# Patient Record
Sex: FEMALE | Race: BLACK OR AFRICAN AMERICAN | Employment: UNEMPLOYED | URBAN - METROPOLITAN AREA
[De-identification: names, ages, dates, MRNs, and addresses within clinical notes are randomized per-mention and may not be internally consistent; named-entity substitution may affect disease eponyms.]

---

## 2017-07-24 ENCOUNTER — HOSPITAL ENCOUNTER (EMERGENCY)
Age: 52
Discharge: HOME | End: 2017-07-24
Attending: FAMILY MEDICINE

## 2017-07-24 VITALS
HEIGHT: 63 IN | TEMPERATURE: 98.1 F | RESPIRATION RATE: 16 BRPM | BODY MASS INDEX: 43.85 KG/M2 | SYSTOLIC BLOOD PRESSURE: 223 MMHG | OXYGEN SATURATION: 94 % | HEART RATE: 56 BPM | DIASTOLIC BLOOD PRESSURE: 102 MMHG | WEIGHT: 247.5 LBS

## 2017-07-24 DIAGNOSIS — I10 ESSENTIAL HYPERTENSION: Primary | ICD-10-CM

## 2017-07-24 RX ORDER — LOSARTAN POTASSIUM AND HYDROCHLOROTHIAZIDE 12.5; 5 MG/1; MG/1
1 TABLET ORAL DAILY
Qty: 30 TAB | Refills: 1 | Status: SHIPPED | OUTPATIENT
Start: 2017-07-24 | End: 2018-02-14

## 2017-07-25 NOTE — DISCHARGE INSTRUCTIONS

## 2017-07-25 NOTE — UC PROVIDER NOTE
HPI Comments: Tova, with hx of HTN not taking medication for \"months\" presents with elevated BP, check BP at work today and was [employer] high. \" Denies CP, SOB, vision change, dizziness, HA, N/V. The history is provided by the patient. Past Medical History:   Diagnosis Date    Asthma     Bronchitis     Diabetes ([address]     Type II, no insulin    Hypertension     No medication taken per patient        Past Surgical History:   Procedure Laterality Date    HX GYN  1984 and 1992    C-sections    HX GYN      Uterine ablation         History reviewed. No pertinent family history. Social History     Social History    Marital status: SINGLE     Spouse name: N/A    Number of children: N/A    Years of education: N/A     Occupational History    Not on file. Social History Main Topics    Smoking status: Never Smoker    Smokeless tobacco: Not on file    Alcohol use No    Drug use: No    Sexual activity: Not on file     Other Topics Concern    Not on file     Social History Narrative                ALLERGIES: Latex    Review of Systems   Constitutional: Negative for chills and fever. Eyes: Negative for visual disturbance. Respiratory: Negative for shortness of breath and wheezing. Cardiovascular: Negative for chest pain and palpitations. Gastrointestinal: Negative for nausea and vomiting. Musculoskeletal: Negative for myalgias. Skin: Negative for rash. Neurological: Negative for dizziness, weakness and light-headedness. Vitals:    07/24/17 1952   BP: (!) 225/94   Pulse: (!) 56   Resp: 16   Temp: 98.1 °F (36.7 °C)   SpO2: 94%   Weight: 112.3 kg (247 lb 8 oz)   Height: 5' 3\" (1.6 m)       Physical Exam   Constitutional: She appears well-developed and well-nourished. No distress. Eyes:   Fundoscopic exam:       The right eye shows no papilledema. The left eye shows no papilledema. Cardiovascular: Normal rate, regular rhythm and normal heart sounds.     Pulmonary/Chest: Effort normal and breath sounds normal. No respiratory distress. She has no wheezes. She has no rales. Neurological: She is alert. Skin: She is not diaphoretic. Psychiatric: She has a normal mood and affect. Her behavior is normal. Judgment and thought content normal.   Nursing note and vitals reviewed. MDM     Differential Diagnosis; Clinical Impression; Plan:     CLINICAL IMPRESSION:  Essential hypertension  (primary encounter diagnosis)    Plan:  1. Losartan-HCTZ  2. Keep BP log  3. F/u with pcp ASAP - list given  Risk of Significant Complications, Morbidity, and/or Mortality:   Presenting problems: Moderate  Management options:   Moderate      Procedures

## 2018-02-13 ENCOUNTER — HOSPITAL ENCOUNTER (EMERGENCY)
Age: 53
Discharge: HOME | End: 2018-02-14
Attending: EMERGENCY MEDICINE
Payer: SELF-PAY

## 2018-02-13 DIAGNOSIS — D25.9 UTERINE LEIOMYOMA, UNSPECIFIED LOCATION: ICD-10-CM

## 2018-02-13 DIAGNOSIS — E11.65 TYPE 2 DIABETES MELLITUS WITH HYPERGLYCEMIA, WITHOUT LONG-TERM CURRENT USE OF INSULIN (HCC): ICD-10-CM

## 2018-02-13 DIAGNOSIS — R10.31 ABDOMINAL PAIN, RIGHT LOWER QUADRANT: Primary | ICD-10-CM

## 2018-02-13 LAB
APPEARANCE UR: CLEAR
BACTERIA URNS QL MICRO: NEGATIVE /HPF
BILIRUB UR QL: NEGATIVE
COLOR UR: ABNORMAL
EPITH CASTS URNS QL MICRO: ABNORMAL /LPF
GLUCOSE UR STRIP.AUTO-MCNC: >1000 MG/DL
HGB UR QL STRIP: NEGATIVE
HYALINE CASTS URNS QL MICRO: ABNORMAL /LPF (ref 0–5)
KETONES UR QL STRIP.AUTO: NEGATIVE MG/DL
LEUKOCYTE ESTERASE UR QL STRIP.AUTO: NEGATIVE
NITRITE UR QL STRIP.AUTO: NEGATIVE
PH UR STRIP: 7 [PH] (ref 5–8)
PROT UR STRIP-MCNC: NEGATIVE MG/DL
RBC #/AREA URNS HPF: ABNORMAL /HPF (ref 0–5)
SP GR UR REFRACTOMETRY: 1.01 (ref 1–1.03)
UA: UC IF INDICATED,UAUC: ABNORMAL
UROBILINOGEN UR QL STRIP.AUTO: 1 EU/DL (ref 0.2–1)
WBC URNS QL MICRO: ABNORMAL /HPF (ref 0–4)

## 2018-02-13 PROCEDURE — 85025 COMPLETE CBC W/AUTO DIFF WBC: CPT | Performed by: EMERGENCY MEDICINE

## 2018-02-13 PROCEDURE — 99283 EMERGENCY DEPT VISIT LOW MDM: CPT

## 2018-02-13 PROCEDURE — 80053 COMPREHEN METABOLIC PANEL: CPT | Performed by: EMERGENCY MEDICINE

## 2018-02-13 PROCEDURE — 36415 COLL VENOUS BLD VENIPUNCTURE: CPT | Performed by: EMERGENCY MEDICINE

## 2018-02-13 PROCEDURE — 81001 URINALYSIS AUTO W/SCOPE: CPT | Performed by: EMERGENCY MEDICINE

## 2018-02-14 ENCOUNTER — APPOINTMENT (OUTPATIENT)
Dept: CT IMAGING | Age: 53
End: 2018-02-14
Attending: EMERGENCY MEDICINE
Payer: SELF-PAY

## 2018-02-14 VITALS
BODY MASS INDEX: 45.16 KG/M2 | SYSTOLIC BLOOD PRESSURE: 175 MMHG | HEART RATE: 71 BPM | WEIGHT: 254.85 LBS | TEMPERATURE: 98.3 F | DIASTOLIC BLOOD PRESSURE: 90 MMHG | HEIGHT: 63 IN | OXYGEN SATURATION: 100 % | RESPIRATION RATE: 16 BRPM

## 2018-02-14 LAB
ALBUMIN SERPL-MCNC: 3.3 G/DL (ref 3.5–5)
ALBUMIN/GLOB SERPL: 0.8 {RATIO} (ref 1.1–2.2)
ALP SERPL-CCNC: 142 U/L (ref 45–117)
ALT SERPL-CCNC: 38 U/L (ref 12–78)
ANION GAP SERPL CALC-SCNC: 7 MMOL/L (ref 5–15)
AST SERPL-CCNC: 15 U/L (ref 15–37)
BASOPHILS # BLD: 0 K/UL (ref 0–0.1)
BASOPHILS NFR BLD: 0 % (ref 0–1)
BILIRUB SERPL-MCNC: 0.4 MG/DL (ref 0.2–1)
BUN SERPL-MCNC: 11 MG/DL (ref 6–20)
BUN/CREAT SERPL: 13 (ref 12–20)
CALCIUM SERPL-MCNC: 9.1 MG/DL (ref 8.5–10.1)
CHLORIDE SERPL-SCNC: 103 MMOL/L (ref 97–108)
CO2 SERPL-SCNC: 29 MMOL/L (ref 21–32)
CREAT SERPL-MCNC: 0.87 MG/DL (ref 0.55–1.02)
DIFFERENTIAL METHOD BLD: ABNORMAL
EOSINOPHIL # BLD: 0 K/UL (ref 0–0.4)
EOSINOPHIL NFR BLD: 0 % (ref 0–7)
ERYTHROCYTE [DISTWIDTH] IN BLOOD BY AUTOMATED COUNT: 12.1 % (ref 11.5–14.5)
GLOBULIN SER CALC-MCNC: 3.9 G/DL (ref 2–4)
GLUCOSE SERPL-MCNC: 290 MG/DL (ref 65–100)
HCT VFR BLD AUTO: 42 % (ref 35–47)
HGB BLD-MCNC: 13.4 G/DL (ref 11.5–16)
IMM GRANULOCYTES # BLD: 0 K/UL (ref 0–0.04)
IMM GRANULOCYTES NFR BLD AUTO: 0 % (ref 0–0.5)
LYMPHOCYTES # BLD: 3.7 K/UL (ref 0.8–3.5)
LYMPHOCYTES NFR BLD: 36 % (ref 12–49)
MCH RBC QN AUTO: 27.3 PG (ref 26–34)
MCHC RBC AUTO-ENTMCNC: 31.9 G/DL (ref 30–36.5)
MCV RBC AUTO: 85.7 FL (ref 80–99)
MONOCYTES # BLD: 0.8 K/UL (ref 0–1)
MONOCYTES NFR BLD: 7 % (ref 5–13)
NEUTS SEG # BLD: 5.8 K/UL (ref 1.8–8)
NEUTS SEG NFR BLD: 56 % (ref 32–75)
NRBC # BLD: 0 K/UL (ref 0–0.01)
NRBC BLD-RTO: 0 PER 100 WBC
PLATELET # BLD AUTO: 205 K/UL (ref 150–400)
PMV BLD AUTO: 11.2 FL (ref 8.9–12.9)
POTASSIUM SERPL-SCNC: 3.6 MMOL/L (ref 3.5–5.1)
PROT SERPL-MCNC: 7.2 G/DL (ref 6.4–8.2)
RBC # BLD AUTO: 4.9 M/UL (ref 3.8–5.2)
SODIUM SERPL-SCNC: 139 MMOL/L (ref 136–145)
WBC # BLD AUTO: 10.3 K/UL (ref 3.6–11)

## 2018-02-14 PROCEDURE — 96374 THER/PROPH/DIAG INJ IV PUSH: CPT

## 2018-02-14 PROCEDURE — 96375 TX/PRO/DX INJ NEW DRUG ADDON: CPT

## 2018-02-14 PROCEDURE — 96361 HYDRATE IV INFUSION ADD-ON: CPT

## 2018-02-14 PROCEDURE — 74177 CT ABD & PELVIS W/CONTRAST: CPT

## 2018-02-14 PROCEDURE — 74011250636 HC RX REV CODE- 250/636

## 2018-02-14 PROCEDURE — 74011250636 HC RX REV CODE- 250/636: Performed by: EMERGENCY MEDICINE

## 2018-02-14 PROCEDURE — 96376 TX/PRO/DX INJ SAME DRUG ADON: CPT

## 2018-02-14 PROCEDURE — 74011636320 HC RX REV CODE- 636/320: Performed by: EMERGENCY MEDICINE

## 2018-02-14 RX ORDER — ONDANSETRON 2 MG/ML
4 INJECTION INTRAMUSCULAR; INTRAVENOUS
Status: COMPLETED | OUTPATIENT
Start: 2018-02-14 | End: 2018-02-14

## 2018-02-14 RX ORDER — METFORMIN HYDROCHLORIDE 500 MG/1
500 TABLET ORAL 2 TIMES DAILY WITH MEALS
Qty: 60 TAB | Refills: 4 | Status: SHIPPED | OUTPATIENT
Start: 2018-02-14 | End: 2018-05-01

## 2018-02-14 RX ORDER — MORPHINE SULFATE 2 MG/ML
4 INJECTION, SOLUTION INTRAMUSCULAR; INTRAVENOUS
Status: COMPLETED | OUTPATIENT
Start: 2018-02-14 | End: 2018-02-14

## 2018-02-14 RX ORDER — SODIUM CHLORIDE 0.9 % (FLUSH) 0.9 %
10 SYRINGE (ML) INJECTION
Status: COMPLETED | OUTPATIENT
Start: 2018-02-14 | End: 2018-02-14

## 2018-02-14 RX ORDER — OXYCODONE AND ACETAMINOPHEN 5; 325 MG/1; MG/1
1 TABLET ORAL
Qty: 20 TAB | Refills: 0 | Status: SHIPPED | OUTPATIENT
Start: 2018-02-14 | End: 2018-05-01

## 2018-02-14 RX ORDER — MORPHINE SULFATE 2 MG/ML
INJECTION, SOLUTION INTRAMUSCULAR; INTRAVENOUS
Status: DISCONTINUED
Start: 2018-02-14 | End: 2018-02-14 | Stop reason: HOSPADM

## 2018-02-14 RX ORDER — ONDANSETRON 2 MG/ML
INJECTION INTRAMUSCULAR; INTRAVENOUS
Status: DISCONTINUED
Start: 2018-02-14 | End: 2018-02-14 | Stop reason: HOSPADM

## 2018-02-14 RX ORDER — LOSARTAN POTASSIUM AND HYDROCHLOROTHIAZIDE 12.5; 5 MG/1; MG/1
1 TABLET ORAL DAILY
Qty: 30 TAB | Refills: 4 | Status: SHIPPED | OUTPATIENT
Start: 2018-02-14 | End: 2018-05-01

## 2018-02-14 RX ORDER — SODIUM CHLORIDE 9 MG/ML
50 INJECTION, SOLUTION INTRAVENOUS
Status: COMPLETED | OUTPATIENT
Start: 2018-02-14 | End: 2018-02-14

## 2018-02-14 RX ADMIN — MORPHINE SULFATE 4 MG: 2 INJECTION, SOLUTION INTRAMUSCULAR; INTRAVENOUS at 01:15

## 2018-02-14 RX ADMIN — SODIUM CHLORIDE 50 ML/HR: 900 INJECTION, SOLUTION INTRAVENOUS at 00:55

## 2018-02-14 RX ADMIN — ONDANSETRON HYDROCHLORIDE 4 MG: 2 INJECTION, SOLUTION INTRAMUSCULAR; INTRAVENOUS at 00:09

## 2018-02-14 RX ADMIN — Medication 10 ML: at 00:55

## 2018-02-14 RX ADMIN — IOPAMIDOL 100 ML: 755 INJECTION, SOLUTION INTRAVENOUS at 00:55

## 2018-02-14 RX ADMIN — ONDANSETRON HYDROCHLORIDE 4 MG: 2 INJECTION, SOLUTION INTRAMUSCULAR; INTRAVENOUS at 01:15

## 2018-02-14 RX ADMIN — MORPHINE SULFATE 4 MG: 2 INJECTION, SOLUTION INTRAMUSCULAR; INTRAVENOUS at 00:09

## 2018-02-14 RX ADMIN — SODIUM CHLORIDE 1000 ML: 900 INJECTION, SOLUTION INTRAVENOUS at 00:09

## 2018-02-14 NOTE — ED NOTES
Dr. Bonita Palomo went over dc instructions with pt at this time. Pt dc home with family via wheelchair.

## 2018-02-14 NOTE — DISCHARGE INSTRUCTIONS
Uterine Fibroids: Care Instructions  Your Care Instructions    Uterine fibroids are growths in the uterus. Fibroids aren't cancer. Doctors don't know what causes fibroids. Fibroids are very common in women during their childbearing years. Fibroids can grow on the inside of the uterus, in the muscle wall of the uterus, or near the outside wall of the uterus. In some women, fibroids cause painful cramps and heavy periods. In these cases, taking anti-inflammatory medicines, birth control pills, or using an intrauterine device (IUD) often helps decrease symptoms. Sometimes surgery is needed to treat fibroids. But if you are near menopause, you may want to wait and see if your symptoms get better. Most fibroids shrink and go away after menopause, when your menstrual periods stop completely. Follow-up care is a key part of your treatment and safety. Be sure to make and go to all appointments, and call your doctor if you are having problems. It's also a good idea to know your test results and keep a list of the medicines you take. How can you care for yourself at home? · If your doctor gave you medicine, take it as exactly as prescribed. Be safe with medicines. Call your doctor if you think you are having a problem with your medicine. · Take anti-inflammatory medicines for pain. These include ibuprofen (Advil, Motrin) and naproxen (Aleve). Read and follow all instructions on the label. · Use heat, such as a hot water bottle or a heating pad set on low, or a warm bath to relax tense muscles and relieve cramping. Put a thin cloth between the heating pad and your skin. Never go to sleep with a heating pad on. · Lie down and put a pillow under your knees. Or, lie on your side and bring your knees up to your chest. These positions may help relieve belly pain or pressure. · Keep track of how many sanitary pads or tampons you use each day. · Get at least 30 minutes of exercise on most days of the week.  Walking is a good choice. You also may want to do other activities, such as running, swimming, cycling, or playing tennis or team sports. · If you bleed longer than usual or have heavy bleeding, take a daily multivitamin with iron. When should you call for help? Call your doctor now or seek immediate medical care if:  ? · You have severe vaginal bleeding. ? · You have new or worse belly or pelvic pain. ? Watch closely for changes in your health, and be sure to contact your doctor if:  ? · You have unusual vaginal bleeding. ? · You do not get better as expected. Where can you learn more? Go to http://nash-marivel.info/. Enter B121 in the search box to learn more about \"Uterine Fibroids: Care Instructions. \"  Current as of: October 13, 2016  Content Version: 11.4  © 7364-9816 [employer]. Care instructions adapted under license by [employer] (which disclaims liability or warranty for this information). If you have questions about a medical condition or this instruction, always ask your healthcare professional. [place] [address] any warranty or liability for your use of this information. Type 2 Diabetes: Care Instructions  Your Care Instructions    Type 2 diabetes is a disease that develops when the body's tissues cannot use insulin properly. Over time, the pancreas cannot make enough insulin. Insulin is a hormone that helps the body's cells use sugar (glucose) for energy. It also helps the body store extra sugar in muscle, fat, and liver cells. Without insulin, the sugar cannot get into the cells to do its work. It stays in the blood instead. This can cause high blood sugar levels. A person has diabetes when the blood sugar stays too high too much of the time. Over time, diabetes can lead to diseases of the heart, blood vessels, nerves, kidneys, and eyes.   You may be able to control your blood sugar by losing weight, eating a healthy diet, and getting daily exercise. You may also have to take insulin or other diabetes medicine. Follow-up care is a key part of your treatment and safety. Be sure to make and go to all appointments. Call your doctor if you are having problems. It's also a good idea to know your test results and keep a list of the medicines you take. How can you care for yourself at home? · Keep your blood sugar at a target level (which you set with your doctor). ¨ Eat a good diet that spreads carbohydrate throughout the day. Carbohydrate-the body's main source of fuel-affects blood sugar more than any other nutrient. Carbohydrate is in fruits, vegetables, milk, and yogurt. It also is in breads, cereals, vegetables such as potatoes and corn, and sugary foods such as candy and cakes. ¨ Aim for 30 minutes of exercise on most, preferably all, days of the week. Walking is a good choice. You also may want to do other activities, such as running, swimming, cycling, or playing tennis or team sports. If your doctor says it's okay, do muscle-strengthening exercises at least 2 times a week. ¨ Take your medicines exactly as prescribed. Call your doctor if you think you are having a problem with your medicine. You will get more details on the specific medicines your doctor prescribes. · Check your blood sugar as often as your doctor recommends. It is important to keep track of any symptoms you have, such as low blood sugar. Also tell your doctor if you have any changes in your activities, diet, or insulin use. · Talk to your doctor before you start taking aspirin every day. Aspirin can help certain people lower their risk of a heart attack or stroke. But taking aspirin isn't right for everyone, because it can cause serious bleeding. · Do not smoke. If you need help quitting, talk to your doctor about stop-smoking programs and medicines. These can increase your chances of quitting for good.   · Keep your cholesterol and blood pressure at normal levels. You may need to take one or more medicines to reach your goals. Take them exactly as directed. Do not stop or change a medicine without talking to your doctor first.  When should you call for help? Call 911 anytime you think you may need emergency care. For example, call if:  ? · You passed out (lost consciousness), or you suddenly become very sleepy or confused. (You may have very low blood sugar.)   ? Call your doctor now or seek immediate medical care if:  ? · Your blood sugar is 300 mg/dL or is higher than the level your doctor has set for you. ? · You have symptoms of low blood sugar, such as:  ¨ Sweating. ¨ Feeling nervous, shaky, and weak. ¨ Extreme hunger and slight nausea. ¨ Dizziness and headache. ¨ Blurred vision. ¨ Confusion. ? Watch closely for changes in your health, and be sure to contact your doctor if:  ? · You often have problems controlling your blood sugar. ? · You have symptoms of long-term diabetes problems, such as:  ¨ New vision changes. ¨ New pain, numbness, or tingling in your hands or feet. ¨ Skin problems. Where can you learn more? Go to http://nash-marivel.info/. Enter C553 in the search box to learn more about \"Type 2 Diabetes: Care Instructions. \"  Current as of: March 13, 2017  Content Version: 11.4  © 3298-0680 [employer]. Care instructions adapted under license by [employer] (which disclaims liability or warranty for this information). If you have questions about a medical condition or this instruction, always ask your healthcare professional. [place] [address] any warranty or liability for your use of this information. Abdominal Pain: Care Instructions  Your Care Instructions    Abdominal pain has many possible causes. Some aren't serious and get better on their own in a few days. Others need more testing and treatment.  If your pain continues or gets worse, you need to be rechecked and may need more tests to find out what is wrong. You may need surgery to correct the problem. Don't ignore new symptoms, such as fever, nausea and vomiting, urination problems, pain that gets worse, and dizziness. These may be signs of a more serious problem. Your doctor may have recommended a follow-up visit in the next 8 to 12 hours. If you are not getting better, you may need more tests or treatment. The doctor has checked you carefully, but problems can develop later. If you notice any problems or new symptoms, get medical treatment right away. Follow-up care is a key part of your treatment and safety. Be sure to make and go to all appointments, and call your doctor if you are having problems. It's also a good idea to know your test results and keep a list of the medicines you take. How can you care for yourself at home? · Rest until you feel better. · To prevent dehydration, drink plenty of fluids, enough so that your urine is light yellow or clear like water. Choose water and other caffeine-free clear liquids until you feel better. If you have kidney, heart, or liver disease and have to limit fluids, talk with your doctor before you increase the amount of fluids you drink. · If your stomach is upset, eat mild foods, such as rice, dry toast or crackers, bananas, and applesauce. Try eating several small meals instead of two or three large ones. · Wait until 48 hours after all symptoms have gone away before you have spicy foods, alcohol, and drinks that contain caffeine. · Do not eat foods that are high in fat. · Avoid anti-inflammatory medicines such as aspirin, ibuprofen (Advil, Motrin), and naproxen (Aleve). These can cause stomach upset. Talk to your doctor if you take daily aspirin for another health problem. When should you call for help? Call 911 anytime you think you may need emergency care. For example, call if:  ? · You passed out (lost consciousness). ? · You pass maroon or very bloody stools. ? · You vomit blood or what looks like coffee grounds. ? · You have new, severe belly pain. ?Call your doctor now or seek immediate medical care if:  ? · Your pain gets worse, especially if it becomes focused in one area of your belly. ? · You have a new or higher fever. ? · Your stools are black and look like tar, or they have streaks of blood. ? · You have unexpected vaginal bleeding. ? · You have symptoms of a urinary tract infection. These may include:  ¨ Pain when you urinate. ¨ Urinating more often than usual.  ¨ Blood in your urine. ? · You are dizzy or lightheaded, or you feel like you may faint. ? Watch closely for changes in your health, and be sure to contact your doctor if:  ? · You are not getting better after 1 day (24 hours). Where can you learn more? Go to http://nash-marivel.info/. Enter G405 in the search box to learn more about \"Abdominal Pain: Care Instructions. \"  Current as of: March 20, 2017  Content Version: 11.4  © 4042-0428 [employer]. Care instructions adapted under license by [employer] (which disclaims liability or warranty for this information). If you have questions about a medical condition or this instruction, always ask your healthcare professional. [address] any warranty or liability for your use of this information.

## 2018-02-14 NOTE — ED PROVIDER NOTES
EMERGENCY DEPARTMENT HISTORY AND PHYSICAL EXAM      Date: 2/13/2018  Patient Name: Concepcion Kelley    History of Presenting Illness     Chief Complaint   Patient presents with    Abdominal Pain     Pt ambulatory to triage with c/o abdominal cramping for 2 days. Pt states she started her menstual cycle for  the first time in 7 months. Reports nausea. History Provided By: Patient    HPI: Concepcion Kelley, 50 y.o. female with PMHx significant for uterine ablation, presents ambulatory to the ED with c/o gradual onset intermittent lower abdominal pain with associated nausea since yesterday. She reports exacerbation in pain with movement. Pt describes her pain as \"feeling like I'm in labor. \" She notes she she started her menstrual cycle yesterday for the first time in 7 months. Pt states she has been told in the past that she has uterine fibroids. Pt further endorses polyuria and mild \"pressure\" when urinating. Pt states she is currently uninsured and ran out of her medication for HTN and DM months ago. She also does not currently have an OB/GYN and was last seen by [facility] in March 2017. She specifically denies any recent unexpected weight loss, decreased appetite, rectal bleeding, melena, hematochezia, vomiting, or fevers. There are no other complaints, changes, or physical findings at this time. Current Outpatient Prescriptions   Medication Sig Dispense Refill    losartan-hydroCHLOROthiazide (HYZAAR) 50-12.5 mg per tablet Take 1 Tab by mouth daily. 30 Tab 4    metFORMIN (GLUCOPHAGE) 500 mg tablet Take 1 Tab by mouth two (2) times daily (with meals). 60 Tab 4    oxyCODONE-acetaminophen (PERCOCET) 5-325 mg per tablet Take 1 Tab by mouth every four (4) hours as needed for Pain. Max Daily Amount: 6 Tabs.  20 Tab 0       Past History     Past Medical History:  Past Medical History:   Diagnosis Date    Asthma     Bronchitis     Diabetes (HCC)     Type II, no insulin    Hypertension No medication taken per patient       Past Surgical History:  Past Surgical History:   Procedure Laterality Date    HX GYN  1984 and 1992    C-sections    HX GYN      Uterine ablation       Family History:  No family history on file. Social History:  Social History   Substance Use Topics    Smoking status: Never Smoker    Smokeless tobacco: Not on file    Alcohol use No       Allergies: Allergies   Allergen Reactions    Latex Other (comments)     peeling         Review of Systems   Review of Systems   Constitutional: Negative for activity change, chills, fever and unexpected weight change. HENT: Negative for congestion and sore throat. Eyes: Negative for pain and redness. Respiratory: Negative for cough, chest tightness and shortness of breath. Cardiovascular: Negative for chest pain and palpitations. Gastrointestinal: Positive for abdominal pain and nausea. Negative for blood in stool, diarrhea and vomiting.        -melena   Endocrine: Positive for polyuria. Genitourinary: Negative for dysuria, frequency and urgency. +\"pressure\" while urinating   Musculoskeletal: Negative for back pain and neck pain. Skin: Negative for rash. Neurological: Negative for syncope, light-headedness and headaches. Psychiatric/Behavioral: Negative for confusion. All other systems reviewed and are negative. Physical Exam   Physical Exam   Constitutional: She is oriented to person, place, and time. She appears well-developed and well-nourished. No distress. HENT:   Head: Normocephalic. Nose: Nose normal.   Mouth/Throat: Oropharynx is clear and moist. No oropharyngeal exudate. Eyes: Conjunctivae are normal. Pupils are equal, round, and reactive to light. No scleral icterus. Neck: Normal range of motion. Neck supple. No JVD present. No tracheal deviation present. No thyromegaly present. Cardiovascular: Normal rate, regular rhythm and intact distal pulses.   Exam reveals no gallop and no friction rub. No murmur heard. Pulmonary/Chest: Effort normal and breath sounds normal. No stridor. No respiratory distress. She has no wheezes. She has no rales. Abdominal: Soft. Bowel sounds are normal. She exhibits no distension. There is no rebound and no guarding. Moderate diffuse lower abdominal tenderness, R>L   Musculoskeletal: Normal range of motion. She exhibits no edema. Lymphadenopathy:     She has no cervical adenopathy. Neurological: She is alert and oriented to person, place, and time. No cranial nerve deficit. She exhibits normal muscle tone. Coordination normal.   Skin: Skin is warm and dry. No rash noted. She is not diaphoretic. No erythema. Psychiatric: She has a normal mood and affect. Her behavior is normal.   Nursing note and vitals reviewed.       Diagnostic Study Results   Labs -   Recent Results (from the past 12 hour(s))   URINALYSIS W/ REFLEX CULTURE    Collection Time: 02/13/18 10:46 PM   Result Value Ref Range    Color YELLOW/STRAW      Appearance CLEAR CLEAR      Specific gravity 1.015 1.003 - 1.030      pH (UA) 7.0 5.0 - 8.0      Protein NEGATIVE  NEG mg/dL    Glucose >1000 (A) NEG mg/dL    Ketone NEGATIVE  NEG mg/dL    Bilirubin NEGATIVE  NEG      Blood NEGATIVE  NEG      Urobilinogen 1.0 0.2 - 1.0 EU/dL    Nitrites NEGATIVE  NEG      Leukocyte Esterase NEGATIVE  NEG      UA:UC IF INDICATED CULTURE NOT INDICATED BY UA RESULT CNI      WBC 0-4 0 - 4 /hpf    RBC 0-5 0 - 5 /hpf    Epithelial cells FEW FEW /lpf    Bacteria NEGATIVE  NEG /hpf    Hyaline cast 0-2 0 - 5 /lpf   CBC WITH AUTOMATED DIFF    Collection Time: 02/13/18 11:36 PM   Result Value Ref Range    WBC 10.3 3.6 - 11.0 K/uL    RBC 4.90 3.80 - 5.20 M/uL    HGB 13.4 11.5 - 16.0 g/dL    HCT 42.0 35.0 - 47.0 %    MCV 85.7 80.0 - 99.0 FL    MCH 27.3 26.0 - 34.0 PG    MCHC 31.9 30.0 - 36.5 g/dL    RDW 12.1 11.5 - 14.5 %    PLATELET 502 502 - 411 K/uL    MPV 11.2 8.9 - 12.9 FL    NRBC 0.0 0  WBC    ABSOLUTE NRBC 0. 00 0.00 - 0.01 K/uL    NEUTROPHILS 56 32 - 75 %    LYMPHOCYTES 36 12 - 49 %    MONOCYTES 7 5 - 13 %    EOSINOPHILS 0 0 - 7 %    BASOPHILS 0 0 - 1 %    IMMATURE GRANULOCYTES 0 0.0 - 0.5 %    ABS. NEUTROPHILS 5.8 1.8 - 8.0 K/UL    ABS. LYMPHOCYTES 3.7 (H) 0.8 - 3.5 K/UL    ABS. MONOCYTES 0.8 0.0 - 1.0 K/UL    ABS. EOSINOPHILS 0.0 0.0 - 0.4 K/UL    ABS. BASOPHILS 0.0 0.0 - 0.1 K/UL    ABS. IMM. GRANS. 0.0 0.00 - 0.04 K/UL    DF AUTOMATED     METABOLIC PANEL, COMPREHENSIVE    Collection Time: 02/13/18 11:36 PM   Result Value Ref Range    Sodium 139 136 - 145 mmol/L    Potassium 3.6 3.5 - 5.1 mmol/L    Chloride 103 97 - 108 mmol/L    CO2 29 21 - 32 mmol/L    Anion gap 7 5 - 15 mmol/L    Glucose 290 (H) 65 - 100 mg/dL    BUN 11 6 - 20 MG/DL    Creatinine 0.87 0.55 - 1.02 MG/DL    BUN/Creatinine ratio 13 12 - 20      GFR est AA >60 >60 ml/min/1.73m2    GFR est non-AA >60 >60 ml/min/1.73m2    Calcium 9.1 8.5 - 10.1 MG/DL    Bilirubin, total 0.4 0.2 - 1.0 MG/DL    ALT (SGPT) 38 12 - 78 U/L    AST (SGOT) 15 15 - 37 U/L    Alk. phosphatase 142 (H) 45 - 117 U/L    Protein, total 7.2 6.4 - 8.2 g/dL    Albumin 3.3 (L) 3.5 - 5.0 g/dL    Globulin 3.9 2.0 - 4.0 g/dL    A-G Ratio 0.8 (L) 1.1 - 2.2         Radiologic Studies -  CT Results  (Last 48 hours)               02/14/18 0055  CT ABD PELV W CONT Final result    Impression:  IMPRESSION:       1. Normal appendix. 2. Small amount of fluid around the bilateral ovaries. Uterine fibroids. Prominent endometrial canal, without definite change, correlate clinically. Narrative:  INDICATION: Abdominal pain, RLQ       COMPARISON: May 16, 2016       TECHNIQUE:    Following the uneventful intravenous administration of 100 cc Isovue-370, thin   axial images were obtained through the abdomen and pelvis. Coronal and sagittal   reconstructions were generated. Oral contrast was not administered.  CT dose   reduction was achieved through use of a standardized protocol tailored for this examination and automatic exposure control for dose modulation. FINDINGS:    LUNG BASES: No abnormality. LIVER: No mass or biliary dilatation. GALLBLADDER: Unremarkable. SPLEEN: No enlargement or lesion. PANCREAS: No mass or ductal dilatation. ADRENALS: No mass. KIDNEYS: No mass, calculus, or hydronephrosis. GI TRACT:  No bowel obstruction or wall thickening allowing for lack of oral   contrast   PERITONEUM: No free air or free fluid. APPENDIX: Unremarkable. RETROPERITONEUM: No lymphadenopathy or aortic aneurysm. ADDITIONAL COMMENTS: N/A.       URINARY BLADDER: Unremarkable. REPRODUCTIVE ORGANS: Uterus is present with unchanged focal low density in the   uterine fundus/endometrial canal. Slight nodular appearance of the uterus   suggests underlying fibroids. Small amount of free fluid surrounding both   ovaries. LYMPH NODES:  None enlarged. FREE FLUID:  Small amount around the adnexal regions and cul-de-sac. BONES: No destructive bone lesion. ADDITIONAL COMMENTS: N/A. Medical Decision Making   I am the first provider for this patient. I reviewed the vital signs, available nursing notes, past medical history, past surgical history, family history and social history. Vital Signs-Reviewed the patient's vital signs. Patient Vitals for the past 12 hrs:   Temp Pulse Resp BP SpO2   02/14/18 0015 - - - 175/90 100 %   02/13/18 2345 - - - (!) 175/93 100 %   02/13/18 2340 - - - - 99 %   02/13/18 2340 - - - 195/83 99 %   02/13/18 2222 98.3 °F (36.8 °C) 71 16 (!) 219/93 100 %       Pulse Oximetry Analysis - 99% on RA    Cardiac Monitor:   Rate: 71 bpm  Rhythm: Normal Sinus Rhythm      Records Reviewed: Nursing Notes and Old Medical Records    Provider Notes (Medical Decision Making):   DDx: uterine fibroid, diverticulitis, appendicitis, UTI    ED Course:   Initial assessment performed.  The patients presenting problems have been discussed, and they are in agreement with the care plan formulated and outlined with them. I have encouraged them to ask questions as they arise throughout their visit. 1:12 AM  Discussed all lab and imaging results. She agrees to OB/GYN f/u. Have answered all questions. Discharge Note:  1:14 AM  The patient has been re-evaluated and is ready for discharge. Reviewed available results with patient. Counseled patient on diagnosis and care plan. Patient has expressed understanding, and all questions have been answered. Patient agrees with plan and agrees to follow up as recommended, or to return to the ED if their symptoms worsen. Discharge instructions have been provided and explained to the patient, along with reasons to return to the ED. Suspect uterine fibroids as cause of pain vs ruptured ovarian cyst; pt afebrile; no increased wbc; ua negative; ct abd/pelvis otherwise without acute abnormality; Rachel Miranda MD      PLAN:  1. Current Discharge Medication List      START taking these medications    Details   metFORMIN (GLUCOPHAGE) 500 mg tablet Take 1 Tab by mouth two (2) times daily (with meals). Qty: 60 Tab, Refills: 4      oxyCODONE-acetaminophen (PERCOCET) 5-325 mg per tablet Take 1 Tab by mouth every four (4) hours as needed for Pain. Max Daily Amount: 6 Tabs. Qty: 20 Tab, Refills: 0    Associated Diagnoses: Abdominal pain, right lower quadrant         CONTINUE these medications which have CHANGED    Details   losartan-hydroCHLOROthiazide (HYZAAR) 50-12.5 mg per tablet Take 1 Tab by mouth daily. Qty: 30 Tab, Refills: 4           2. Follow-up Information     Follow up With Details Comments [address] Ayon MD Schedule an appointment as soon as possible for a visit in 1 week  [address] [place] [address] [address] [place] [address] [address] [facility] [address] [phone]          Return to ED if worse     Diagnosis     Clinical Impression:   1. Abdominal pain, right lower quadrant    2.  Type 2 diabetes mellitus with hyperglycemia, without long-term current use of insulin ([facility] [address]    3. Uterine leiomyoma, unspecified location        Attestations: This note is prepared by Delmar Mann, acting as Scribe for Arleen Christensen MD.    The scribe's documentation has been prepared under my direction and personally reviewed by me in its entirety. I confirm that the note above accurately reflects all work, treatment, procedures, and medical decision making performed by me.   Arleen Christensen MD

## 2018-03-05 ENCOUNTER — HOSPITAL ENCOUNTER (EMERGENCY)
Age: 53
Discharge: HOME | End: 2018-03-05
Attending: EMERGENCY MEDICINE
Payer: SELF-PAY

## 2018-03-05 VITALS
SYSTOLIC BLOOD PRESSURE: 170 MMHG | HEIGHT: 63 IN | WEIGHT: 250 LBS | DIASTOLIC BLOOD PRESSURE: 70 MMHG | OXYGEN SATURATION: 100 % | HEART RATE: 70 BPM | TEMPERATURE: 99.1 F | BODY MASS INDEX: 44.3 KG/M2 | RESPIRATION RATE: 19 BRPM

## 2018-03-05 DIAGNOSIS — I10 ESSENTIAL HYPERTENSION: ICD-10-CM

## 2018-03-05 DIAGNOSIS — K05.30 PERIODONTITIS: Primary | ICD-10-CM

## 2018-03-05 PROCEDURE — 99283 EMERGENCY DEPT VISIT LOW MDM: CPT

## 2018-03-05 PROCEDURE — 74011000250 HC RX REV CODE- 250: Performed by: EMERGENCY MEDICINE

## 2018-03-05 PROCEDURE — 74011250637 HC RX REV CODE- 250/637: Performed by: EMERGENCY MEDICINE

## 2018-03-05 RX ORDER — IBUPROFEN 800 MG/1
800 TABLET ORAL
Qty: 20 TAB | Refills: 0 | Status: SHIPPED | OUTPATIENT
Start: 2018-03-05 | End: 2018-03-12

## 2018-03-05 RX ORDER — HYDROCHLOROTHIAZIDE 25 MG/1
25 TABLET ORAL DAILY
Qty: 30 TAB | Refills: 0 | Status: SHIPPED | OUTPATIENT
Start: 2018-03-05 | End: 2018-04-04

## 2018-03-05 RX ORDER — IBUPROFEN 400 MG/1
800 TABLET ORAL
Status: COMPLETED | OUTPATIENT
Start: 2018-03-05 | End: 2018-03-05

## 2018-03-05 RX ORDER — PENICILLIN V POTASSIUM 250 MG/1
500 TABLET, FILM COATED ORAL 4 TIMES DAILY
Qty: 56 TAB | Refills: 0 | Status: SHIPPED | OUTPATIENT
Start: 2018-03-05 | End: 2018-03-12

## 2018-03-05 RX ORDER — CLONIDINE HYDROCHLORIDE 0.1 MG/1
0.2 TABLET ORAL
Status: COMPLETED | OUTPATIENT
Start: 2018-03-05 | End: 2018-03-05

## 2018-03-05 RX ADMIN — IBUPROFEN 800 MG: 400 TABLET, FILM COATED ORAL at 05:29

## 2018-03-05 RX ADMIN — CLONIDINE HYDROCHLORIDE 0.2 MG: 0.1 TABLET ORAL at 05:57

## 2018-03-05 RX ADMIN — LIDOCAINE HYDROCHLORIDE: 20 SOLUTION ORAL; TOPICAL at 05:30

## 2018-03-05 NOTE — ED NOTES
Patient educated on discharge instructions and three prescriptions . Patient verbalized understanding of eduction. Patient given discharge instructions and three prescriptions. Patient ambulated out of ED. No acute distress noted. Emergency Department Nursing Plan of Care       The Nursing Plan of Care is developed from the Nursing assessment and Emergency Department Attending provider initial evaluation. The plan of care may be reviewed in the ED Provider note.     The Plan of Care was developed with the following considerations:   Patient / Family readiness to learn indicated by:verbalized understanding  Persons(s) to be included in education: patient  Barriers to Learning/Limitations:No    Signed     Stanley Perry RN    3/5/2018   6:23 AM

## 2018-03-05 NOTE — ED PROVIDER NOTES
EMERGENCY DEPARTMENT HISTORY AND PHYSICAL EXAM      Date: 3/5/2018  Patient Name: Gloria Cr    History of Presenting Illness     Chief Complaint   Patient presents with    Dental Pain     Right upper posterior dental pain that started yesterday. History Provided By: Patient    HPI: Gloria Cr, 50 y.o. female with PMHx significant for HTN (not currently taking medication) presents ambulatory to the ED with cc of gradual R posterior/upper dental pain that began yesterday afternoon after eating steak. Pt reports her fill-in fell out and tooth fractured last year, but she did not have any issues until yesterday. She endorses associated R facial pain/numbness. Pt notes exacerbation with cold/hot liquids. She conveys using ice compressions without any relief. Pt states she is not currently taking any medication. She specifically denies any recent fevers, chills, or nausea/vomiting. PCP: None    There are no other complaints, changes, or physical findings at this time. Current Outpatient Prescriptions   Medication Sig Dispense Refill    hydroCHLOROthiazide (HYDRODIURIL) 25 mg tablet Take 1 Tab by mouth daily for 30 days. 30 Tab 0    penicillin v potassium (VEETID) 250 mg tablet Take 2 Tabs by mouth four (4) times daily for 7 days. 56 Tab 0    ibuprofen (MOTRIN) 800 mg tablet Take 1 Tab by mouth every six (6) hours as needed for Pain for up to 7 days. 20 Tab 0    metFORMIN (GLUCOPHAGE) 500 mg tablet Take 1 Tab by mouth two (2) times daily (with meals). 60 Tab 4    losartan-hydroCHLOROthiazide (HYZAAR) 50-12.5 mg per tablet Take 1 Tab by mouth daily. 30 Tab 4    oxyCODONE-acetaminophen (PERCOCET) 5-325 mg per tablet Take 1 Tab by mouth every four (4) hours as needed for Pain. Max Daily Amount: 6 Tabs.  20 Tab 0       Past History     Past Medical History:  Past Medical History:   Diagnosis Date    Asthma     Bronchitis     Diabetes (HCC)     Type II, no insulin    Hypertension     No medication taken per patient       Past Surgical History:  Past Surgical History:   Procedure Laterality Date    HX GYN  1984 and 1992    C-sections    HX GYN      Uterine ablation       Family History:  History reviewed. No pertinent family history. Social History:  Social History   Substance Use Topics    Smoking status: Never Smoker    Smokeless tobacco: None    Alcohol use No       Allergies: Allergies   Allergen Reactions    Latex Other (comments)     peeling         Review of Systems   Review of Systems   Constitutional: Negative. Negative for fever. HENT: Positive for dental problem (with associated facial pain/numbness). Negative for drooling, facial swelling and trouble swallowing. Eyes: Negative. Negative for discharge and redness. Respiratory: Negative. Negative for chest tightness, shortness of breath and wheezing. Cardiovascular: Negative. Negative for chest pain. Gastrointestinal: Negative. Negative for abdominal distention, abdominal pain, constipation, diarrhea, nausea and vomiting. Endocrine: Negative. Genitourinary: Negative. Negative for difficulty urinating and dysuria. Musculoskeletal: Negative. Negative for arthralgias and myalgias. Skin: Negative. Negative for color change and rash. Allergic/Immunologic: Negative. Neurological: Negative. Negative for syncope, facial asymmetry and speech difficulty. Hematological: Negative. Psychiatric/Behavioral: Negative. Negative for agitation and confusion. All other systems reviewed and are negative. Physical Exam   Physical Exam   Constitutional: She is oriented to person, place, and time. She appears well-developed and well-nourished. HENT:   Head: Normocephalic and atraumatic. Mouth/Throat: Oropharynx is clear and moist. She does not have dentures. Abnormal dentition. Right upper posterior tooth fractured   Eyes: Conjunctivae and EOM are normal.   Neck: Neck supple.    Cardiovascular: Normal rate, regular rhythm and intact distal pulses. Pulmonary/Chest: Effort normal. No respiratory distress. Abdominal: Soft. There is no tenderness. Musculoskeletal: Normal range of motion. She exhibits no deformity. Neurological: She is alert and oriented to person, place, and time. Skin: Skin is warm and dry. Psychiatric: She has a normal mood and affect. Her behavior is normal. Thought content normal.   Vitals reviewed. Diagnostic Study Results     Labs -   No results found for this or any previous visit (from the past 12 hour(s)). Radiologic Studies -   No orders to display       Medical Decision Making   I am the first provider for this patient. I reviewed the vital signs, available nursing notes, past medical history, past surgical history, family history and social history. Vital Signs-Reviewed the patient's vital signs. Patient Vitals for the past 12 hrs:   Temp Pulse Resp BP SpO2   03/05/18 0557 - - - (!) 227/104 -   03/05/18 0510 99.1 °F (37.3 °C) 70 19 (!) 220/99 100 %     Records Reviewed: Nursing Notes and Old Medical Records    Provider Notes (Medical Decision Making):     ED Course:   Initial assessment performed. The patients presenting problems have been discussed, and they are in agreement with the care plan formulated and outlined with them. I have encouraged them to ask questions as they arise throughout their visit.    5:51 AM  Pt reports instant relief with dental balls. She states she is not currently taking any BP medication. Will order Clonidine. 6:00 AM  Changed BP medication to $4 prescription per patient's request. Discussed with pt the need to follow up for blood pressure recheck when symptoms are resolved. Pt denies any current headaches, dizziness, blurry vision, numbness and weakness. Discharge Note:  6:02 AM  The patient has been re-evaluated and is ready for discharge. Reviewed available results with patient.  Counseled patient on diagnosis and care plan. Patient has expressed understanding, and all questions have been answered. Patient agrees with plan and agrees to follow up as recommended, or to return to the ED if their symptoms worsen. Discharge instructions have been provided and explained to the patient, along with reasons to return to the ED. PLAN:  1. Current Discharge Medication List      START taking these medications    Details   hydroCHLOROthiazide (HYDRODIURIL) 25 mg tablet Take 1 Tab by mouth daily for 30 days. Qty: 30 Tab, Refills: 0      penicillin v potassium (VEETID) 250 mg tablet Take 2 Tabs by mouth four (4) times daily for 7 days. Qty: 56 Tab, Refills: 0      ibuprofen (MOTRIN) 800 mg tablet Take 1 Tab by mouth every six (6) hours as needed for Pain for up to 7 days. Qty: 20 Tab, Refills: 0           2. Follow-up Information     Follow up With Details Comments Contact Info    None Schedule an appointment as soon as possible for a visit  None  Patient stated that they have no PCP      [facility] EMERGENCY DEPT  As needed, If symptoms worsen [address] [facility]  [phone]    Your PCP           Return to ED if worse     Diagnosis     Clinical Impression:   1. Periodontitis    2. Essential hypertension        Attestations: This note is prepared by Jamir Chen, acting as Scribe for Marquez Saenz MD.    The scribe's documentation has been prepared under my direction and personally reviewed by me in its entirety. I confirm that the note above accurately reflects all work, treatment, procedures, and medical decision making performed by me. Marquez Saenz MD        This note will not be viewable in [address]

## 2018-03-05 NOTE — ED NOTES
Patient presented to the ED today for complaints of dental pain. Patient says symptoms started yesterday. Respirations are even and unlabored. Skin is dry,warm, and intact.

## 2018-03-05 NOTE — DISCHARGE INSTRUCTIONS
Emergency [address]  [address] [facility] [place], [facility], [address]   Phone: [phone]    Penn Laird VIKTOR RADHA SHEPHERDN  [address], [facility], [address]igi [address]   Phone: [phone], select option (2) to confirm time for treatment     [facility]  [address] [facility], [facility], [address]   Monday-Friday: 8am-4pm  Phone: [phone] of Dentistry Urgent [address] [facility], [address] [facility], [address]Gigi Paiz Final [address] [place] [address]  Phone: [phone] to confirm a time for emergency treatment  Pediatrics: [phone]     [facility]  Phone: [phone]    [facility]  [address], [address] [place] [address]   Phone [phone] or [phone]  Hours [id number] (extractions)  Simple tooth extraction: $60 per tooth, $55 per x-ray     [facility] (in [place])  [facility] only  Phone: [phone], leave message saying you need an appointment to register  Hours: Wed 6-9p     [facility]  Disabled and over age 58 only  Phone: [phone]    Non-Urgent [facility] ([facility])  [address] [facility], [place] [address]  Phone: [phone]     A.D. [address] [facility] [address] [facility], [facility]   Dental Clinic: [phone]  Oral Surgery Clinic: [phone]         Local Dentists    [facility]  [address]  [phone]    Moiz Jung DDS  [address]  [phone]    Javier Nicole DDS  [place] [address]  [phone]    [address]  [phone]               High Blood Pressure: Care Instructions  Your Care Instructions    If your blood pressure is usually above 140/90, you have high blood pressure, or hypertension. That means the top number is 140 or higher or the bottom number is 90 or higher, or both.   Despite what a lot of people think, high blood pressure usually doesn't cause headaches or make you feel dizzy or lightheaded. It usually has no symptoms. But it does increase your risk for heart attack, stroke, and kidney or eye damage. The higher your blood pressure, the more your risk increases. Your doctor will give you a goal for your blood pressure. Your goal will be based on your health and your age. An example of a goal is to keep your blood pressure below 140/90. Lifestyle changes, such as eating healthy and being active, are always important to help lower blood pressure. You might also take medicine to reach your blood pressure goal.  Follow-up care is a key part of your treatment and safety. Be sure to make and go to all appointments, and call your doctor if you are having problems. It's also a good idea to know your test results and keep a list of the medicines you take. How can you care for yourself at home? Medical treatment  · If you stop taking your medicine, your blood pressure will go back up. You may take one or more types of medicine to lower your blood pressure. Be safe with medicines. Take your medicine exactly as prescribed. Call your doctor if you think you are having a problem with your medicine. · Talk to your doctor before you start taking aspirin every day. Aspirin can help certain people lower their risk of a heart attack or stroke. But taking aspirin isn't right for everyone, because it can cause serious bleeding. · See your doctor regularly. You may need to see the doctor more often at first or until your blood pressure comes down. · If you are taking blood pressure medicine, talk to your doctor before you take decongestants or anti-inflammatory medicine, such as ibuprofen. Some of these medicines can raise blood pressure. · Learn how to check your blood pressure at home. Lifestyle changes  · Stay at a healthy weight.  This is especially important if you put on weight around the waist. Losing even 10 pounds can help you lower your blood pressure. · If your doctor recommends it, get more exercise. Walking is a good choice. Bit by bit, increase the amount you walk every day. Try for at least 30 minutes on most days of the week. You also may want to swim, bike, or do other activities. · Avoid or limit alcohol. Talk to your doctor about whether you can drink any alcohol. · Try to limit how much sodium you eat to less than 2,300 milligrams (mg) a day. Your doctor may ask you to try to eat less than 1,500 mg a day. · Eat plenty of fruits (such as bananas and oranges), vegetables, legumes, whole grains, and low-fat dairy products. · Lower the amount of saturated fat in your diet. Saturated fat is found in animal products such as milk, cheese, and meat. Limiting these foods may help you lose weight and also lower your risk for heart disease. · Do not smoke. Smoking increases your risk for heart attack and stroke. If you need help quitting, talk to your doctor about stop-smoking programs and medicines. These can increase your chances of quitting for good. When should you call for help? Call 911 anytime you think you may need emergency care. This may mean having symptoms that suggest that your blood pressure is causing a serious heart or blood vessel problem. Your blood pressure may be over 180/110. ? For example, call 911 if:  ? · You have symptoms of a heart attack. These may include:  ¨ Chest pain or pressure, or a strange feeling in the chest.  ¨ Sweating. ¨ Shortness of breath. ¨ Nausea or vomiting. ¨ Pain, pressure, or a strange feeling in the back, neck, jaw, or upper belly or in one or both shoulders or arms. ¨ Lightheadedness or sudden weakness. ¨ A fast or irregular heartbeat. ? · You have symptoms of a stroke. These may include:  ¨ Sudden numbness, tingling, weakness, or loss of movement in your face, arm, or leg, especially on only one side of your body. ¨ Sudden vision changes. ¨ Sudden trouble speaking.   ¨ Sudden confusion or trouble understanding simple statements. ¨ Sudden problems with walking or balance. ¨ A sudden, severe headache that is different from past headaches. ? · You have severe back or belly pain. ?Do not wait until your blood pressure comes down on its own. Get help right away. ?Call your doctor now or seek immediate care if:  ? · Your blood pressure is much higher than normal (such as 180/110 or higher), but you don't have symptoms. ? · You think high blood pressure is causing symptoms, such as:  ¨ Severe headache. ¨ Blurry vision. ? Watch closely for changes in your health, and be sure to contact your doctor if:  ? · Your blood pressure measures 140/90 or higher at least 2 times. That means the top number is 140 or higher or the bottom number is 90 or higher, or both. ? · You think you may be having side effects from your blood pressure medicine. ? · Your blood pressure is usually normal, but it goes above normal at least 2 times. Where can you learn more? Go to http://nash-marivel.info/. Enter X523 in the search box to learn more about \"High Blood Pressure: Care Instructions. \"  Current as of: September 21, 2016  Content Version: 11.4  © 2004-6116 [employer]. Care instructions adapted under license by [employer] (which disclaims liability or warranty for this information). If you have questions about a medical condition or this instruction, always ask your healthcare professional. [place] [address] any warranty or liability for your use of this information. Periodontal Conditions: Care Instructions  Your Care Instructions    Periodontal conditions affect the gums, bone, and tissue that surround and support the teeth. The most common problems are caused by plaque. Plaque is a thin film of bacteria that sticks to teeth above and below the gum line. It can build up and harden into tartar.  The bacteria in plaque and tartar can cause gum disease. Gingivitis is a disease that affects the gums (gingiva). The gums are the soft tissue that surrounds the teeth. Gingivitis causes red, swollen, tender gums that bleed easily when brushed, persistent bad breath, and sensitive teeth. Because it is not painful, many people do not get treatment when they should. Gingivitis can be reversed with good dental care. Periodontitis is a more advanced disease that affects more than the gums. The gums pull away from the teeth. This leaves deep pockets where bacteria can grow. The disease can damage the bones that support the teeth. The teeth may get loose and fall out. A periodontal condition should be treated as soon as it is found. Finding gum problems early, treating them right away, and having regular checkups bring the best results. You can treat mild periodontal conditions by brushing and flossing your teeth every day. Your dentist may prescribe a mouthwash to kill the bacteria that can damage teeth and gums. Your dentist may have you take antibiotics to treat infection from moderate periodontal disease. If your gums have pulled away from your teeth, you may need cleaning between the teeth and gums right down to the teeth roots. This is called root planing and scaling. If you have severe periodontal disease, you may need surgery to remove diseased gum tissue or repair bone damage. Follow-up care is a key part of your treatment and safety. Be sure to make and go to all appointments, and call your dentist if you are having problems. It's also a good idea to know your test results and keep a list of the medicines you take. How can you care for yourself at home? · If your dentist prescribed antibiotics, take them as directed. Do not stop taking them just because you feel better. You need to take the full course of antibiotics. · Brush your teeth twice a day, in the morning and at night.   ¨ Use a toothbrush with soft, rounded-end bristles and a head that is small enough to reach all parts of your teeth and mouth. Replace your toothbrush every 3 to 4 months. ¨ Use a fluoride toothpaste. ¨ Place the brush at a 45-degree angle where the teeth meet the gums. Press firmly, and gently rock the brush back and forth using small circular movements. ¨ Brush chewing surfaces vigorously with short back-and-forth strokes. ¨ Brush your tongue from back to front. · Floss at least once a day. Choose the type and flavor that you like best.  · Have your teeth cleaned by a professional at least twice a year. · Ask your dentist about using an antibacterial mouthwash to help reduce bacteria. · Rinse your mouth with water or chew sugar-free gum after meals if you can't brush your teeth. · Do not smoke or use smokeless tobacco. Tobacco use can cause periodontal disease. When should you call for help? Call your dentist now or seek immediate medical care if:  ? · You have symptoms of infection, such as:  ¨ Increased pain, swelling, warmth, or redness. ¨ Red streaks leading from the area. ¨ Pus draining from the area. ¨ A fever. ? Watch closely for changes in your health, and be sure to contact your dentist if:  ? · You have new or worse tooth pain. ? · You do not get better as expected. Where can you learn more? Go to http://nash-marivel.info/. Enter B756 in the search box to learn more about \"Periodontal Conditions: Care Instructions. \"  Current as of: May 12, 2017  Content Version: 11.4  © 1223-7456 [employer]. Care instructions adapted under license by [employer] (which disclaims liability or warranty for this information). If you have questions about a medical condition or this instruction, always ask your healthcare professional. [place] [address] any warranty or liability for your use of this information.

## 2018-04-29 ENCOUNTER — HOSPITAL ENCOUNTER (EMERGENCY)
Age: 53
Discharge: HOME | End: 2018-04-29
Attending: EMERGENCY MEDICINE
Payer: SELF-PAY

## 2018-04-29 VITALS
RESPIRATION RATE: 20 BRPM | SYSTOLIC BLOOD PRESSURE: 183 MMHG | WEIGHT: 246.31 LBS | HEART RATE: 97 BPM | TEMPERATURE: 98.6 F | OXYGEN SATURATION: 98 % | BODY MASS INDEX: 43.64 KG/M2 | DIASTOLIC BLOOD PRESSURE: 94 MMHG | HEIGHT: 63 IN

## 2018-04-29 DIAGNOSIS — L02.214 ABSCESS OF GROIN, LEFT: Primary | ICD-10-CM

## 2018-04-29 PROCEDURE — 74011000250 HC RX REV CODE- 250: Performed by: PHYSICIAN ASSISTANT

## 2018-04-29 PROCEDURE — 99283 EMERGENCY DEPT VISIT LOW MDM: CPT

## 2018-04-29 PROCEDURE — 75810000289 HC I&D ABSCESS SIMP/COMP/MULT

## 2018-04-29 PROCEDURE — 77030019895 HC PCKNG STRP IODO -A

## 2018-04-29 PROCEDURE — 74011250637 HC RX REV CODE- 250/637: Performed by: PHYSICIAN ASSISTANT

## 2018-04-29 RX ORDER — DOXYCYCLINE HYCLATE 100 MG
100 TABLET ORAL
Status: COMPLETED | OUTPATIENT
Start: 2018-04-29 | End: 2018-04-29

## 2018-04-29 RX ORDER — NAPROXEN 250 MG/1
500 TABLET ORAL
Status: COMPLETED | OUTPATIENT
Start: 2018-04-29 | End: 2018-04-29

## 2018-04-29 RX ORDER — LIDOCAINE HYDROCHLORIDE AND EPINEPHRINE 10; 10 MG/ML; UG/ML
1.5 INJECTION, SOLUTION INFILTRATION; PERINEURAL ONCE
Status: COMPLETED | OUTPATIENT
Start: 2018-04-29 | End: 2018-04-29

## 2018-04-29 RX ORDER — HYDROCODONE BITARTRATE AND ACETAMINOPHEN 5; 325 MG/1; MG/1
1 TABLET ORAL
Qty: 8 TAB | Refills: 0 | Status: SHIPPED | OUTPATIENT
Start: 2018-04-29 | End: 2018-09-29

## 2018-04-29 RX ORDER — DOXYCYCLINE 100 MG/1
100 CAPSULE ORAL 2 TIMES DAILY
Qty: 14 CAP | Refills: 0 | Status: SHIPPED | OUTPATIENT
Start: 2018-04-29 | End: 2018-05-06

## 2018-04-29 RX ADMIN — NAPROXEN 500 MG: 250 TABLET ORAL at 22:36

## 2018-04-29 RX ADMIN — DOXYCYCLINE HYCLATE 100 MG: 100 TABLET, FILM COATED ORAL at 22:32

## 2018-04-29 RX ADMIN — LIDOCAINE HYDROCHLORIDE,EPINEPHRINE BITARTRATE 15 MG: 10; .01 INJECTION, SOLUTION INFILTRATION; PERINEURAL at 21:54

## 2018-04-29 NOTE — LETTER
[facility] EMERGENCY DEPT 
[address] [place] [address] [address] [address] 
[phone] Work/School Note Date: 4/29/2018 To Whom It May concern: 
 
Stephen Samaniego was seen and treated today in the emergency room by the following provider(s): 
Attending Provider: Alen Frederick MD 
Physician Assistant: Leslee Curtis. Stephen Samaniego may return to work on 5/1/2018. Sincerely, JAIME Curtis

## 2018-04-30 NOTE — ED NOTES
Discharge instructions were given to the patient by JAIME Moe. The patient left the Emergency Department ambulatory, alert and oriented and in no acute distress with 2 prescriptions. The patient was encouraged to call or return to the ED for worsening issues or problems and was encouraged to schedule a follow up appointment for continuing care. The patient verbalized understanding of discharge instructions and prescriptions, all questions were answered. The patient has no further concerns at this time.

## 2018-04-30 NOTE — ED PROVIDER NOTES
EMERGENCY DEPARTMENT HISTORY AND PHYSICAL EXAM      Date: 4/29/2018  Patient Name: Rory Drake    History of Presenting Illness     Chief Complaint   Patient presents with    Skin Problem     Patient has three raised, red, and tender areas on left inner thigh that started this past Thursday. Patient thinks something may have bit or stung her  this past Tuesday. History Provided By: Patient    HPI: Rory Drake, 48 y.o. female with PMHx significant for asthma, htn, DM presents ambulatory to the ED with cc of 3 small abscess to left groin x 5 days. Pt reports the lesions started out smaller, she tried to pop them, and then the next day the wounds was larger. Pt reports lower abscess is drainage purulent and bloody fluid. Reports constant achy pain. Rates pain 8/10. Has been taking motrin for sx. Denies previous abscess. Chief Complaint: abscess  Duration: 5 Days  Timing:  Gradual and Constant  Location: left groin  Quality: Aching  Severity: 8 out of 10  Modifying Factors: worse with palpation  Associated Symptoms: drainage      There are no other complaints, changes, or physical findings at this time. PCP: None    Current Outpatient Prescriptions   Medication Sig Dispense Refill    doxycycline (MONODOX) 100 mg capsule Take 1 Cap by mouth two (2) times a day for 7 days. 14 Cap 0    HYDROcodone-acetaminophen (NORCO) 5-325 mg per tablet Take 1 Tab by mouth every six (6) hours as needed for Pain. Max Daily Amount: 4 Tabs. 8 Tab 0    losartan-hydroCHLOROthiazide (HYZAAR) 50-12.5 mg per tablet Take 1 Tab by mouth daily. 30 Tab 4    metFORMIN (GLUCOPHAGE) 500 mg tablet Take 1 Tab by mouth two (2) times daily (with meals). 60 Tab 4    oxyCODONE-acetaminophen (PERCOCET) 5-325 mg per tablet Take 1 Tab by mouth every four (4) hours as needed for Pain. Max Daily Amount: 6 Tabs.  20 Tab 0       Past History     Past Medical History:  Past Medical History:   Diagnosis Date    Asthma     Bronchitis  Diabetes ([facility] [address]     Type II, no insulin    Hypertension     No medication taken per patient       Past Surgical History:  Past Surgical History:   Procedure Laterality Date    HX GYN  1984 and 1992    C-sections    HX GYN      Uterine ablation       Family History:  No family history on file. Social History:  Social History   Substance Use Topics    Smoking status: Never Smoker    Smokeless tobacco: Not on file    Alcohol use No       Allergies: Allergies   Allergen Reactions    Latex Other (comments)     peeling         Review of Systems   Review of Systems   Constitutional: Negative for chills and fever. Respiratory: Negative for shortness of breath. Cardiovascular: Negative for chest pain. Gastrointestinal: Negative for abdominal pain, nausea and vomiting. Genitourinary: Negative for flank pain. Musculoskeletal: Negative for back pain and myalgias. Skin: Positive for color change and wound. Negative for pallor and rash. Left groin   Neurological: Negative for dizziness, weakness and light-headedness. All other systems reviewed and are negative. Physical Exam   Physical Exam   Constitutional: She is oriented to person, place, and time. She appears well-developed and well-nourished. No distress. HENT:   Head: Normocephalic and atraumatic. Eyes: Conjunctivae are normal.   Cardiovascular: Normal rate, regular rhythm and normal heart sounds. Pulmonary/Chest: Effort normal and breath sounds normal. No respiratory distress. Abdominal: Soft. Bowel sounds are normal. She exhibits no distension. Musculoskeletal: Normal range of motion. Neurological: She is alert and oriented to person, place, and time. Skin: Skin is warm. No rash noted. Psychiatric: She has a normal mood and affect. Her behavior is normal.   Nursing note and vitals reviewed.         Diagnostic Study Results     Labs -   No results found for this or any previous visit (from the past 12 hour(s)). Radiologic Studies -   No orders to display     CT Results  (Last 48 hours)    None        CXR Results  (Last 48 hours)    None            Medical Decision Making   I am the first provider for this patient. I reviewed the vital signs, available nursing notes, past medical history, past surgical history, family history and social history. Vital Signs-Reviewed the patient's vital signs. Patient Vitals for the past 12 hrs:   Temp Pulse Resp BP SpO2   04/29/18 2057 98.6 °F (37 °C) 97 20 (!) 183/94 98 %       Records Reviewed: Nursing Notes, Old Medical Records, Previous Radiology Studies and Previous Laboratory Studies    Provider Notes (Medical Decision Making):   DDx: Abscess, Cyst, Cellulitis, Bug Bite    ED Course:   Initial assessment performed. The patients presenting problems have been discussed, and they are in agreement with the care plan formulated and outlined with them. I have encouraged them to ask questions as they arise throughout their visit. I&D ABCESS COMP/MULTIPLE  Date/Time: 4/29/2018 10:20 PM  Performed by: Gudelia Kate  Authorized by: Jack PATEL     Consent:     Consent obtained:  Written    Consent given by:  Patient    Risks discussed:  Bleeding, incomplete drainage, infection and pain    Alternatives discussed:  No treatment  Location:     Type:  Abscess    Size:  4 cm    Location: left groin. Pre-procedure details:     Skin preparation:  Betadine  Anesthesia (see MAR for exact dosages):      Anesthesia method:  Local infiltration    Local anesthetic:  Lidocaine 1% WITH epi  Procedure type:     Complexity:  Complex  Procedure details:     Incision types:  Single straight    Incision depth:  Dermal    Scalpel blade:  11    Wound management:  Probed and deloculated and irrigated with saline    Drainage:  Bloody    Drainage amount:  Scant    Packing materials:  1/4 in iodoform gauze    Amount 1/4\" iodoform:  5 cm  Post-procedure details:     Patient tolerance of procedure: Tolerated well, no immediate complications          Disposition:  10:30 PM  Discussed dx and treatment plan. Discussed importance of PCP follow up. All questions answered. Pt voiced they understood. Return if sx worsen. PLAN:  1. Discharge Medication List as of 4/29/2018 10:30 PM      START taking these medications    Details   doxycycline (MONODOX) 100 mg capsule Take 1 Cap by mouth two (2) times a day for 7 days. , Normal, Disp-14 Cap, R-0      HYDROcodone-acetaminophen (NORCO) 5-325 mg per tablet Take 1 Tab by mouth every six (6) hours as needed for Pain. Max Daily Amount: 4 Tabs., Print, Disp-8 Tab, R-0         CONTINUE these medications which have NOT CHANGED    Details   losartan-hydroCHLOROthiazide (HYZAAR) 50-12.5 mg per tablet Take 1 Tab by mouth daily. , Normal, Disp-30 Tab, R-4      metFORMIN (GLUCOPHAGE) 500 mg tablet Take 1 Tab by mouth two (2) times daily (with meals). , Normal, Disp-60 Tab, R-4      oxyCODONE-acetaminophen (PERCOCET) 5-325 mg per tablet Take 1 Tab by mouth every four (4) hours as needed for Pain. Max Daily Amount: 6 Tabs., Print, Disp-20 Tab, R-0           2. Follow-up Information     Follow up With Details Comments Contact Info    [facility] EMERGENCY DEPT In 2 days For wound re-check Steffen Wilson Schedule an appointment as soon as possible for a visit As needed [address]  [phone]        Return to ED if worse     Diagnosis     Clinical Impression:   1.  Abscess of groin, left

## 2018-04-30 NOTE — DISCHARGE INSTRUCTIONS

## 2018-04-30 NOTE — ED NOTES
Pt presents ambulatory to ED complaining of abscess on inner left thigh x 3 days. Pt reports hx of abscesses. Pt noted to have large inflamed abscess on left thigh, warm to touch. Pt is alert and oriented x 4, RR even and unlabored, skin is warm and dry. Assesment completed and pt updated on plan of care. Emergency Department Nursing Plan of Care       The Nursing Plan of Care is developed from the Nursing assessment and Emergency Department Attending provider initial evaluation. The plan of care may be reviewed in the ED Provider note.     The Plan of Care was developed with the following considerations:   Patient / Family readiness to learn indicated by:verbalized understanding  Persons(s) to be included in education: patient  Barriers to Learning/Limitations:No    Signed     Judge Damian RN    4/29/2018   9:41 PM

## 2018-05-01 ENCOUNTER — HOSPITAL ENCOUNTER (EMERGENCY)
Age: 53
Discharge: HOME | End: 2018-05-01
Attending: EMERGENCY MEDICINE | Admitting: EMERGENCY MEDICINE
Payer: SELF-PAY

## 2018-05-01 VITALS
TEMPERATURE: 98.2 F | BODY MASS INDEX: 43.41 KG/M2 | WEIGHT: 245 LBS | DIASTOLIC BLOOD PRESSURE: 93 MMHG | RESPIRATION RATE: 18 BRPM | HEIGHT: 63 IN | SYSTOLIC BLOOD PRESSURE: 178 MMHG | OXYGEN SATURATION: 96 % | HEART RATE: 78 BPM

## 2018-05-01 DIAGNOSIS — I10 ESSENTIAL HYPERTENSION: ICD-10-CM

## 2018-05-01 DIAGNOSIS — Z51.89 WOUND CHECK, ABSCESS: Primary | ICD-10-CM

## 2018-05-01 DIAGNOSIS — Z76.0 MEDICATION REFILL: ICD-10-CM

## 2018-05-01 PROCEDURE — 99283 EMERGENCY DEPT VISIT LOW MDM: CPT

## 2018-05-01 RX ORDER — AMLODIPINE BESYLATE 5 MG/1
5 TABLET ORAL DAILY
Qty: 30 TAB | Refills: 0 | Status: SHIPPED | OUTPATIENT
Start: 2018-05-01 | End: 2018-05-31

## 2018-05-01 RX ORDER — HYDROCHLOROTHIAZIDE 25 MG/1
25 TABLET ORAL DAILY
Qty: 30 TAB | Refills: 0 | Status: SHIPPED | OUTPATIENT
Start: 2018-05-01 | End: 2018-05-31

## 2018-05-01 RX ORDER — METFORMIN HYDROCHLORIDE 500 MG/1
500 TABLET ORAL 2 TIMES DAILY WITH MEALS
Qty: 60 TAB | Refills: 0 | Status: SHIPPED | OUTPATIENT
Start: 2018-05-01 | End: 2018-05-31

## 2018-05-01 NOTE — ED PROVIDER NOTES
EMERGENCY DEPARTMENT HISTORY AND PHYSICAL EXAM    Date: 5/1/2018  Patient Name: Gilford Sabins    History of Presenting Illness     Chief Complaint   Patient presents with    Wound Check         History Provided By: Patient    HPI: Gilford Sabins is a 48 y.o. female with a PMH of asthma, HTN, DM, bronchitis who presents with need for packing removal to the L inner thigh. Pt had I&D 2 days ago. Pt states she is taking abx as prescribed. Pt rates pain 6/10 dull and burning. Pain worse with palpation. Pt denies any additional symptoms. Pt also request refill on BP and DM meds. Pt states she only takes meds when given from ED because she has no insurance nor PCP. PCP: None    Current Outpatient Prescriptions   Medication Sig Dispense Refill    hydroCHLOROthiazide (HYDRODIURIL) 25 mg tablet Take 1 Tab by mouth daily for 30 days. 30 Tab 0    amLODIPine (NORVASC) 5 mg tablet Take 1 Tab by mouth daily for 30 days. 30 Tab 0    metFORMIN (GLUCOPHAGE) 500 mg tablet Take 1 Tab by mouth two (2) times daily (with meals) for 30 days. 60 Tab 0    doxycycline (MONODOX) 100 mg capsule Take 1 Cap by mouth two (2) times a day for 7 days. 14 Cap 0    HYDROcodone-acetaminophen (NORCO) 5-325 mg per tablet Take 1 Tab by mouth every six (6) hours as needed for Pain. Max Daily Amount: 4 Tabs. 8 Tab 0       Past History     Past Medical History:  Past Medical History:   Diagnosis Date    Asthma     Bronchitis     Diabetes ([facility] [address]     Type II, no insulin    Hypertension     No medication taken per patient       Past Surgical History:  Past Surgical History:   Procedure Laterality Date    HX GYN  1984 and 1992    C-sections    HX GYN      Uterine ablation       Family History:  History reviewed. No pertinent family history. Social History:  Social History   Substance Use Topics    Smoking status: Never Smoker    Smokeless tobacco: Never Used    Alcohol use No       Allergies:   Allergies   Allergen Reactions    Latex Other (comments)     peeling         Review of Systems   Review of Systems   Musculoskeletal: Positive for myalgias. Skin: Positive for color change and wound. Neurological: Negative for speech difficulty and weakness. Psychiatric/Behavioral: Negative for self-injury. All other systems reviewed and are negative. Physical Exam     Vitals:    05/01/18 1258 05/01/18 1259   BP: 180/86 (!) 178/93   Pulse: 78    Resp: 18    Temp: 98.2 °F (36.8 °C)    SpO2: 96%    Weight: 111.1 kg (245 lb)    Height: 5' 3\" (1.6 m)      Physical Exam   Constitutional: She is oriented to person, place, and time. She appears well-developed and well-nourished. No distress. HENT:   Head: Normocephalic and atraumatic. Eyes: Conjunctivae are normal.   Cardiovascular: Normal rate, regular rhythm and normal heart sounds. Pulmonary/Chest: Effort normal and breath sounds normal. No respiratory distress. She has no wheezes. She has no rales. Musculoskeletal:        Left upper leg: She exhibits tenderness. Legs:  Neurological: She is alert and oriented to person, place, and time. Skin: Skin is warm and dry. Psychiatric: She has a normal mood and affect. Her behavior is normal. Judgment and thought content normal.   Nursing note and vitals reviewed. at 2:14 PM      Diagnostic Study Results     Labs -   No results found for this or any previous visit (from the past 12 hour(s)). Radiologic Studies -   No orders to display     CT Results  (Last 48 hours)    None        CXR Results  (Last 48 hours)    None            Medical Decision Making   I am the first provider for this patient. I reviewed the vital signs, available nursing notes, past medical history, past surgical history, family history and social history. Vital Signs-Reviewed the patient's vital signs. Records Reviewed:  Old Medical Records    ED Course:   1:20 PM  Packing removed from L inner thigh, wound cleaned with shur clens and dressed with bandaid. Disposition:  Discharged    DISCHARGE NOTE:   2:36PM      Care plan outlined and precautions discussed. Patient has no new complaints, changes, or physical findings. All medications were reviewed with the patient; will d/c home. All of pt's questions and concerns were addressed. Patient was instructed and agrees to follow up with PCP and/or HBP clinic, as well as to return to the ED upon further deterioration. Patient is ready to go home. Follow-up Information     Follow up With Details Comments Contact Info    Obdulio Remy NP Call for High blood pressure clinic [facility]  [address] [facility] [address] [place] [address]  [phone]    [facility] Schedule an appointment as soon as possible for a visit today  [address] [place] [address]  [phone]          Discharge Medication List as of 5/1/2018  1:30 PM      START taking these medications    Details   hydroCHLOROthiazide (HYDRODIURIL) 25 mg tablet Take 1 Tab by mouth daily for 30 days. , Print, Disp-30 Tab, R-0      amLODIPine (NORVASC) 5 mg tablet Take 1 Tab by mouth daily for 30 days. , Print, Disp-30 Tab, R-0      metFORMIN (GLUCOPHAGE) 500 mg tablet Take 1 Tab by mouth two (2) times daily (with meals) for 30 days. , Print, Disp-60 Tab, R-0         CONTINUE these medications which have NOT CHANGED    Details   doxycycline (MONODOX) 100 mg capsule Take 1 Cap by mouth two (2) times a day for 7 days. , Normal, Disp-14 Cap, R-0      HYDROcodone-acetaminophen (NORCO) 5-325 mg per tablet Take 1 Tab by mouth every six (6) hours as needed for Pain. Max Daily Amount: 4 Tabs., Print, Disp-8 Tab, R-0             Provider Notes (Medical Decision Making):   DDX: wound check, HTN, medication refill, HTN urgency    Procedures        Diagnosis     Clinical Impression:   1. Wound check, abscess    2. Medication refill    3. Essential hypertension

## 2018-05-01 NOTE — ED NOTES
Pt reports having an abscess to left upper leg drained 2 days ago, here for packing removal      Emergency Department Nursing Plan of Care       The Nursing Plan of Care is developed from the Nursing assessment and Emergency Department Attending provider initial evaluation. The plan of care may be reviewed in the ED Provider note.     The Plan of Care was developed with the following considerations:   Patient / Family readiness to learn indicated by:verbalized understanding  Persons(s) to be included in education: patient  Barriers to Learning/Limitations:No    Signed     Tiffany Molina RN    5/1/2018   1:10 PM

## 2018-05-01 NOTE — DISCHARGE INSTRUCTIONS
Acute High Blood Pressure: Care Instructions  Your Care Instructions    Acute high blood pressure is very high blood pressure. It's a serious problem. Very high blood pressure can damage your brain, heart, eyes, and kidneys. You may have been given medicines to lower your blood pressure. You may have gotten them as pills or through a needle in one of your veins. This is called an IV. And maybe you were given other medicines too. These can be needed when high blood pressure causes other problems. To keep your blood pressure at a lower level, you may need to make healthy lifestyle changes. And you will probably need to take medicines. Be sure to follow up with your doctor about your blood pressure and what you can do about it. Follow-up care is a key part of your treatment and safety. Be sure to make and go to all appointments, and call your doctor if you are having problems. It's also a good idea to know your test results and keep a list of the medicines you take. How can you care for yourself at home? · See your doctor as often as he or she recommends. This is to make sure your blood pressure is under control. You will probably go at least 2 times a year. But it may be more often at first.  · Take your blood pressure medicine exactly as prescribed. You may take one or more types. They include diuretics, beta-blockers, ACE inhibitors, calcium channel blockers, and angiotensin II receptor blockers. Call your doctor if you think you are having a problem with your medicine. · If you take blood pressure medicine, talk to your doctor before you take decongestants or anti-inflammatory medicine, such as ibuprofen. These can raise blood pressure. · Learn how to check your blood pressure at home. Check it often. · Ask your doctor if it's okay to drink alcohol. · Talk to your doctor about lifestyle changes that can help blood pressure. These include being active and not smoking.   When should you call for help?  Call 911 anytime you think you may need emergency care. This may mean having symptoms that suggest that your blood pressure is causing a serious heart or blood vessel problem. Your blood pressure may be over 180/110. ? For example, call 911 if:  ? · You have symptoms of a heart attack. These may include:  ¨ Chest pain or pressure, or a strange feeling in the chest.  ¨ Sweating. ¨ Shortness of breath. ¨ Nausea or vomiting. ¨ Pain, pressure, or a strange feeling in the back, neck, jaw, or upper belly or in one or both shoulders or arms. ¨ Lightheadedness or sudden weakness. ¨ A fast or irregular heartbeat. ? · You have symptoms of a stroke. These may include:  ¨ Sudden numbness, tingling, weakness, or loss of movement in your face, arm, or leg, especially on only one side of your body. ¨ Sudden vision changes. ¨ Sudden trouble speaking. ¨ Sudden confusion or trouble understanding simple statements. ¨ Sudden problems with walking or balance. ¨ A sudden, severe headache that is different from past headaches. ? · You have severe back or belly pain. ?Do not wait until your blood pressure comes down on its own. Get help right away. ?Call your doctor now or seek immediate care if:  ? · Your blood pressure is much higher than normal (such as 180/110 or higher), but you don't have symptoms. ? · You think high blood pressure is causing symptoms, such as:  ¨ Severe headache. ¨ Blurry vision. ? Watch closely for changes in your health, and be sure to contact your doctor if:  ? · Your blood pressure measures 140/90 or higher at least 2 times. That means the top number is 140 or higher or the bottom number is 90 or higher, or both. ? · You think you may be having side effects from your blood pressure medicine. ? · Your blood pressure is usually normal, but it goes above normal at least 2 times. Where can you learn more? Go to http://nash-marivel.info/.   Enter Y758 in the search box to learn more about \"Acute High Blood Pressure: Care Instructions. \"  Current as of: September 21, 2016  Content Version: 11.4  © 6777-3765 [employer]. Care instructions adapted under license by [employer] (which disclaims liability or warranty for this information). If you have questions about a medical condition or this instruction, always ask your healthcare professional. [address] any warranty or liability for your use of this information. DASH Diet: Care Instructions  Your Care Instructions    The DASH diet is an eating plan that can help lower your blood pressure. DASH stands for Dietary Approaches to Stop Hypertension. Hypertension is high blood pressure. The DASH diet focuses on eating foods that are high in calcium, potassium, and magnesium. These nutrients can lower blood pressure. The foods that are highest in these nutrients are fruits, vegetables, low-fat dairy products, nuts, seeds, and legumes. But taking calcium, potassium, and magnesium supplements instead of eating foods that are high in those nutrients does not have the same effect. The DASH diet also includes whole grains, fish, and poultry. The DASH diet is one of several lifestyle changes your doctor may recommend to lower your high blood pressure. Your doctor may also want you to decrease the amount of sodium in your diet. Lowering sodium while following the DASH diet can lower blood pressure even further than just the DASH diet alone. Follow-up care is a key part of your treatment and safety. Be sure to make and go to all appointments, and call your doctor if you are having problems. It's also a good idea to know your test results and keep a list of the medicines you take. How can you care for yourself at home? Following the DASH diet  · Eat 4 to 5 servings of fruit each day.  A serving is 1 medium-sized piece of fruit, ½ cup chopped or canned fruit, 1/4 cup dried fruit, or 4 ounces (½ cup) of fruit juice. Choose fruit more often than fruit juice. · Eat 4 to 5 servings of vegetables each day. A serving is 1 cup of lettuce or raw leafy vegetables, ½ cup of chopped or cooked vegetables, or 4 ounces (½ cup) of vegetable juice. Choose vegetables more often than vegetable juice. · Get 2 to 3 servings of low-fat and fat-free dairy each day. A serving is 8 ounces of milk, 1 cup of yogurt, or 1 ½ ounces of cheese. · Eat 6 to 8 servings of grains each day. A serving is 1 slice of bread, 1 ounce of dry cereal, or ½ cup of cooked rice, pasta, or cooked cereal. Try to choose whole-grain products as much as possible. · Limit lean meat, poultry, and fish to 2 servings each day. A serving is 3 ounces, about the size of a deck of cards. · Eat 4 to 5 servings of nuts, seeds, and legumes (cooked dried beans, lentils, and split peas) each week. A serving is 1/3 cup of nuts, 2 tablespoons of seeds, or ½ cup of cooked beans or peas. · Limit fats and oils to 2 to 3 servings each day. A serving is 1 teaspoon of vegetable oil or 2 tablespoons of salad dressing. · Limit sweets and added sugars to 5 servings or less a week. A serving is 1 tablespoon jelly or jam, ½ cup sorbet, or 1 cup of lemonade. · Eat less than 2,300 milligrams (mg) of sodium a day. If you limit your sodium to 1,500 mg a day, you can lower your blood pressure even more. Tips for success  · Start small. Do not try to make dramatic changes to your diet all at once. You might feel that you are missing out on your favorite foods and then be more likely to not follow the plan. Make small changes, and stick with them. Once those changes become habit, add a few more changes. · Try some of the following:  ¨ Make it a goal to eat a fruit or vegetable at every meal and at snacks. This will make it easy to get the recommended amount of fruits and vegetables each day.   ¨ Try yogurt topped with fruit and nuts for a snack or healthy dessert. ¨ Add lettuce, tomato, cucumber, and onion to sandwiches. ¨ Combine a ready-made pizza crust with low-fat mozzarella cheese and lots of vegetable toppings. Try using tomatoes, squash, spinach, broccoli, carrots, cauliflower, and onions. ¨ Have a variety of cut-up vegetables with a low-fat dip as an appetizer instead of chips and dip. ¨ Sprinkle sunflower seeds or chopped almonds over salads. Or try adding chopped walnuts or almonds to cooked vegetables. ¨ Try some vegetarian meals using beans and peas. Add garbanzo or kidney beans to salads. Make burritos and tacos with mashed calderon beans or black beans. Where can you learn more? Go to http://nashAdvaxismarivel.info/. Enter N096 in the search box to learn more about \"DASH Diet: Care Instructions. \"  Current as of: September 21, 2016  Content Version: 11.4  © 7043-4290 [employer]. Care instructions adapted under license by [employer] (which disclaims liability or warranty for this information). If you have questions about a medical condition or this instruction, always ask your healthcare professional. [address] any warranty or liability for your use of this information. Skin Abscess: Care Instructions  Your Care Instructions    A skin abscess is a bacterial infection that forms a pocket of pus. A boil is a kind of skin abscess. The doctor may have cut an opening in the abscess so that the pus can drain out. You may have gauze in the cut so that the abscess will stay open and keep draining. You may need antibiotics. You will need to follow up with your doctor to make sure the infection has gone away. The doctor has checked you carefully, but problems can develop later. If you notice any problems or new symptoms, get medical treatment right away. Follow-up care is a key part of your treatment and safety.  Be sure to make and go to all appointments, and call your doctor if you are having problems. It's also a good idea to know your test results and keep a list of the medicines you take. How can you care for yourself at home? · Apply warm and dry compresses, a heating pad set on low, or a hot water bottle 3 or 4 times a day for pain. Keep a cloth between the heat source and your skin. · If your doctor prescribed antibiotics, take them as directed. Do not stop taking them just because you feel better. You need to take the full course of antibiotics. · Take pain medicines exactly as directed. ¨ If the doctor gave you a prescription medicine for pain, take it as prescribed. ¨ If you are not taking a prescription pain medicine, ask your doctor if you can take an over-the-counter medicine. · Keep your bandage clean and dry. Change the bandage whenever it gets wet or dirty, or at least one time a day. · If the abscess was packed with gauze:  ¨ Keep follow-up appointments to have the gauze changed or removed. If the doctor instructed you to remove the gauze, gently pull out all of the gauze when your doctor tells you to. ¨ After the gauze is removed, soak the area in warm water for 15 to 20 minutes 2 times a day, until the wound closes. When should you call for help? Call your doctor now or seek immediate medical care if:  ? · You have signs of worsening infection, such as:  ¨ Increased pain, swelling, warmth, or redness. ¨ Red streaks leading from the infected skin. ¨ Pus draining from the wound. ¨ A fever. ? Watch closely for changes in your health, and be sure to contact your doctor if:  ? · You do not get better as expected. Where can you learn more? Go to http://nash-marivel.info/. Enter K884 in the search box to learn more about \"Skin Abscess: Care Instructions. \"  Current as of: October 13, 2016  Content Version: 11.4  © 8759-7572 [employer].  Care instructions adapted under license by [employer] (which disclaims liability or warranty for this information). If you have questions about a medical condition or this instruction, always ask your healthcare professional. [place] [address] any warranty or liability for your use of this information.

## 2018-06-12 ENCOUNTER — HOSPITAL ENCOUNTER (EMERGENCY)
Age: 53
Discharge: HOME | End: 2018-06-12
Attending: EMERGENCY MEDICINE
Payer: SELF-PAY

## 2018-06-12 VITALS
BODY MASS INDEX: 42.17 KG/M2 | RESPIRATION RATE: 18 BRPM | TEMPERATURE: 98.1 F | OXYGEN SATURATION: 98 % | SYSTOLIC BLOOD PRESSURE: 159 MMHG | HEART RATE: 98 BPM | DIASTOLIC BLOOD PRESSURE: 73 MMHG | HEIGHT: 63 IN | WEIGHT: 238 LBS

## 2018-06-12 DIAGNOSIS — J02.9 ACUTE PHARYNGITIS, UNSPECIFIED ETIOLOGY: Primary | ICD-10-CM

## 2018-06-12 LAB
DEPRECATED S PYO AG THROAT QL EIA: NEGATIVE
GLUCOSE BLD STRIP.AUTO-MCNC: 350 MG/DL (ref 65–100)
SERVICE CMNT-IMP: ABNORMAL

## 2018-06-12 PROCEDURE — 87070 CULTURE OTHR SPECIMN AEROBIC: CPT | Performed by: EMERGENCY MEDICINE

## 2018-06-12 PROCEDURE — 99283 EMERGENCY DEPT VISIT LOW MDM: CPT

## 2018-06-12 PROCEDURE — 87880 STREP A ASSAY W/OPTIC: CPT | Performed by: PHYSICIAN ASSISTANT

## 2018-06-12 PROCEDURE — 74011000250 HC RX REV CODE- 250: Performed by: PHYSICIAN ASSISTANT

## 2018-06-12 PROCEDURE — 82962 GLUCOSE BLOOD TEST: CPT

## 2018-06-12 RX ORDER — METFORMIN HYDROCHLORIDE 500 MG/1
500 TABLET, EXTENDED RELEASE ORAL
COMMUNITY
End: 2018-07-19

## 2018-06-12 RX ORDER — LIDOCAINE HYDROCHLORIDE 20 MG/ML
15 SOLUTION OROPHARYNGEAL
Status: COMPLETED | OUTPATIENT
Start: 2018-06-12 | End: 2018-06-12

## 2018-06-12 RX ADMIN — LIDOCAINE HYDROCHLORIDE 15 ML: 20 SOLUTION ORAL; TOPICAL at 19:26

## 2018-06-12 NOTE — ED NOTES
Pt presents to the ED with c/o sore tongue and difficulty swallowing x1 week. Pt reports starting a new blood pressure medication (amlodipine) last week and has had a dry mouth and difficulty swallowing. Pt denies v/d. Pt denies fever/chills. Pt reports drinking a lot. Pt erprots eating but it is painful to swallow. Pt is alert and oriented. Pt skin is warm and dry. Pt is able to handle oral secretions. Pt is speaking in full sentences. Emergency Department Nursing Plan of Care       The Nursing Plan of Care is developed from the Nursing assessment and Emergency Department Attending provider initial evaluation. The plan of care may be reviewed in the ED Provider note.     The Plan of Care was developed with the following considerations:   Patient / Family readiness to learn indicated by:verbalized understanding  Persons(s) to be included in education: patient  Barriers to Learning/Limitations:No    Signed     Panchito Hu    6/12/2018   6:43 PM

## 2018-06-12 NOTE — ED TRIAGE NOTES
C/o dry mouth, difficulty swallowing where \"i feel like it gets stuck\" x 7 days or so, currently on ABX for previous skin abscess

## 2018-06-12 NOTE — ED PROVIDER NOTES
EMERGENCY DEPARTMENT HISTORY AND PHYSICAL EXAM    Date: 6/12/2018  Patient Name: Carissa Perez    History of Presenting Illness     Chief Complaint   Patient presents with    Sore Throat         History Provided By: Patient    HPI: Carissa Perez is a 48 y.o. female with a PMH of diabetes and asthma who presents with c/o sore throat X 1 week. She states her throat \"feels like something is in it\". Pt admits to dry mouth with the sore throat. She states she has been unable to eat solids due to pain. Additionally, she c/o a dry cough X 2 weeks. Pt states she was seen at [facility] [place] [place] last week and was given albuterol, prednisone and Keflex for an abscess and asthma exacerbation. Denies fever, nausea, vomiting, constipation, or diarrhea. Denies any sick contacts. Pain rated 9/10 and constant    PCP: None    Current Outpatient Prescriptions   Medication Sig Dispense Refill    metFORMIN ER (GLUCOPHAGE XR) 500 mg tablet Take 500 mg by mouth daily (with dinner).  aluminum-magnesium hydroxide 200-200 mg/5 mL susp 5 mL, diphenhydrAMINE 12.5 mg/5 mL liqd 12.5 mg, lidocaine 2 % soln 5 mL 5 mL by Swish and Spit route two (2) times daily as needed for Pain. Magic mouth wash   Maalox  Lidocaine 2% viscous   Diphenhydramine oral solution     Pharmacy to mix equal portions of ingredients to a total volume as indicated in the dispense amount. 100 mL 0    HYDROcodone-acetaminophen (NORCO) 5-325 mg per tablet Take 1 Tab by mouth every six (6) hours as needed for Pain. Max Daily Amount: 4 Tabs. 8 Tab 0       Past History     Past Medical History:  Past Medical History:   Diagnosis Date    Asthma     Bronchitis     Diabetes ([address]     Type II, no insulin    Hypertension     No medication taken per patient       Past Surgical History:  Past Surgical History:   Procedure Laterality Date    HX GYN  1984 and 1992    C-sections    HX GYN      Uterine ablation       Family History:  History reviewed.  No pertinent family history. Social History:  Social History   Substance Use Topics    Smoking status: Never Smoker    Smokeless tobacco: Never Used    Alcohol use No       Allergies: Allergies   Allergen Reactions    Latex Other (comments)     peeling         Review of Systems   Review of Systems   Constitutional: Negative for chills and fever. HENT: Positive for sore throat, trouble swallowing and voice change (reports hoarseness). Negative for dental problem, drooling, ear pain, facial swelling, postnasal drip, rhinorrhea, sinus pressure, sneezing and tinnitus. Eyes: Negative for photophobia and visual disturbance. Respiratory: Positive for cough. Negative for choking, chest tightness, shortness of breath, wheezing and stridor. Cardiovascular: Negative for chest pain and palpitations. Gastrointestinal: Negative for abdominal pain, constipation, diarrhea, nausea and vomiting. Musculoskeletal: Negative for arthralgias and back pain. Skin: Negative for color change and rash. Neurological: Negative for dizziness, speech difficulty, weakness, light-headedness and headaches. All other systems reviewed and are negative. Physical Exam     Vitals:    06/12/18 1833 06/12/18 1944   BP: (!) 153/96 159/73   Pulse: 92 98   Resp: 18 18   Temp: 98.1 °F (36.7 °C)    SpO2: 97% 98%   Weight: 108 kg (238 lb)    Height: 5' 3\" (1.6 m)      Physical Exam   Constitutional: She is oriented to person, place, and time. She appears well-developed and well-nourished. Non-toxic appearance. She does not have a sickly appearance. She does not appear ill. No distress. HENT:   Head: Normocephalic and atraumatic. Right Ear: Hearing and external ear normal.   Left Ear: Hearing and external ear normal.   Nose: Nose normal.   Mouth/Throat: Uvula is midline. Mucous membranes are not pale, not dry and not cyanotic. Posterior oropharyngeal erythema present. No oropharyngeal exudate, posterior oropharyngeal edema or tonsillar abscesses. Remnants of food noted in mouth during exam   Eyes: Conjunctivae and EOM are normal. Pupils are equal, round, and reactive to light. Right eye exhibits no discharge. Left eye exhibits no discharge. No scleral icterus. Neck: Normal range of motion. Cardiovascular: Normal rate, regular rhythm, S1 normal, S2 normal and intact distal pulses. Exam reveals no gallop, no distant heart sounds and no friction rub. Murmur heard. Systolic murmur is present with a grade of 2/6   Pulmonary/Chest: Effort normal and breath sounds normal. No accessory muscle usage. No respiratory distress. She has no decreased breath sounds. She has no wheezes. She has no rhonchi. She has no rales. She exhibits no tenderness. Abdominal: Soft. Normal appearance. Musculoskeletal: Normal range of motion. Lymphadenopathy:        Head (right side): No submental, no submandibular, no tonsillar, no preauricular, no posterior auricular and no occipital adenopathy present. Head (left side): No submental, no submandibular, no tonsillar, no preauricular, no posterior auricular and no occipital adenopathy present. She has no cervical adenopathy. Neurological: She is alert and oriented to person, place, and time. Coordination and gait normal. GCS eye subscore is 4. GCS verbal subscore is 5. GCS motor subscore is 6. Skin: Skin is warm, dry and intact. No rash noted. She is not diaphoretic. No erythema. No pallor. Psychiatric: She has a normal mood and affect.  Her speech is normal and behavior is normal. Judgment and thought content normal. Cognition and memory are normal.   at 7:34 PM    Diagnostic Study Results     Labs -     Recent Results (from the past 12 hour(s))   STREP AG SCREEN, GROUP A    Collection Time: 06/12/18  7:15 PM   Result Value Ref Range    Group A Strep Ag ID NEGATIVE  NEG     GLUCOSE, POC    Collection Time: 06/12/18  7:41 PM   Result Value Ref Range    Glucose (POC) 350 (H) 65 - 100 mg/dL    Performed by Charu PINTO        Radiologic Studies -   No orders to display     CT Results  (Last 48 hours)    None        CXR Results  (Last 48 hours)    None            Medical Decision Making   I am the first provider for this patient. I reviewed the vital signs, available nursing notes, past medical history, past surgical history, family history and social history. Vital Signs-Reviewed the patient's vital signs. Records Reviewed: Old Medical Records    ED Course:   Pt seen and note written by Sushil ROSARIO, in conjunction with this provider. Shakir Moses PA-C    7:45 PM   prior to discharge but pt admits to drinking apple juice all day. Pt advised to stop, only drink water and take medicine once home. Throat pain has improved to 5/10 after lidocaine. Disposition:  Discharged    DISCHARGE NOTE:   7:50 PM    Will wait for culture before treatment since pt currently on keflex and previously on doxy prior to that. Care plan outlined and precautions discussed. Patient has no new complaints, changes, or physical findings. Results of strep test were reviewed with the patient. All medications were reviewed with the patient; will d/c home with symptomatic care . All of pt's questions and concerns were addressed. Patient was instructed and agrees to follow up with PCP, as well as to return to the ED upon further deterioration. Patient is ready to go home.     Follow-up Information     Follow up With Details Comments [address] Schedule an appointment as soon as possible for a visit in 2 days As needed [facility] [address] - [facility] Schedule an appointment as soon as possible for a visit in 2 days As needed [address]  [phone]          Discharge Medication List as of 6/12/2018  7:31 PM      START taking these medications    Details aluminum-magnesium hydroxide 200-200 mg/5 mL susp 5 mL, diphenhydrAMINE 12.5 mg/5 mL liqd 12.5 mg, lidocaine 2 % soln 5 mL 5 mL by Swish and Spit route two (2) times daily as needed for Pain. Magic mouth wash   Maalox  Lidocaine 2% viscous   Diphenhydramine oral solution     Pharmacy to mix equal portions of ingredients to a total volume as indicated in the dispense amount. ,  Print, Disp-100 mL, R-0         CONTINUE these medications which have NOT CHANGED    Details   metFORMIN ER (GLUCOPHAGE XR) 500 mg tablet Take 500 mg by mouth daily (with dinner). , Historical Med      HYDROcodone-acetaminophen (NORCO) 5-325 mg per tablet Take 1 Tab by mouth every six (6) hours as needed for Pain. Max Daily Amount: 4 Tabs., Print, Disp-8 Tab, R-0             Provider Notes (Medical Decision Making):   DDx: viral pharyngitis, strep pharyngitis, acute bronchitis, asthma exacerbation, GERD,hyperglycemia, hypoglycemia    Procedures        Diagnosis     Clinical Impression:   1.  Acute pharyngitis, unspecified etiology

## 2018-06-12 NOTE — DISCHARGE INSTRUCTIONS
Sore Throat: Care Instructions  Your Care Instructions    Infection by bacteria or a virus causes most sore throats. Cigarette smoke, dry air, air pollution, allergies, and yelling can also cause a sore throat. Sore throats can be painful and annoying. Fortunately, most sore throats go away on their own. If you have a bacterial infection, your doctor may prescribe antibiotics. Follow-up care is a key part of your treatment and safety. Be sure to make and go to all appointments, and call your doctor if you are having problems. It's also a good idea to know your test results and keep a list of the medicines you take. How can you care for yourself at home? · If your doctor prescribed antibiotics, take them as directed. Do not stop taking them just because you feel better. You need to take the full course of antibiotics. · Gargle with warm salt water once an hour to help reduce swelling and relieve discomfort. Use 1 teaspoon of salt mixed in 1 cup of warm water. · Take an over-the-counter pain medicine, such as acetaminophen (Tylenol), ibuprofen (Advil, Motrin), or naproxen (Aleve). Read and follow all instructions on the label. · Be careful when taking over-the-counter cold or flu medicines and Tylenol at the same time. Many of these medicines have acetaminophen, which is Tylenol. Read the labels to make sure that you are not taking more than the recommended dose. Too much acetaminophen (Tylenol) can be harmful. · Drink plenty of fluids. Fluids may help soothe an irritated throat. Hot fluids, such as tea or soup, may help decrease throat pain. · Use over-the-counter throat lozenges to soothe pain. Regular cough drops or hard candy may also help. These should not be given to young children because of the risk of choking. · Do not smoke or allow others to smoke around you. If you need help quitting, talk to your doctor about stop-smoking programs and medicines.  These can increase your chances of quitting for good. · Use a vaporizer or humidifier to add moisture to your bedroom. Follow the directions for cleaning the machine. When should you call for help? Call your doctor now or seek immediate medical care if:  ? · You have new or worse trouble swallowing. ? · Your sore throat gets much worse on one side. ? Watch closely for changes in your health, and be sure to contact your doctor if you do not get better as expected. Where can you learn more? Go to http://nash-marivel.info/. Enter [phone] in the search box to learn more about \"Sore Throat: Care Instructions. \"  Current as of: May 12, 2017  Content Version: 11.4  © 0077-7991 Healthwise, Incorporated. Care instructions adapted under license by [employer] (which disclaims liability or warranty for this information). If you have questions about a medical condition or this instruction, always ask your healthcare professional. [address] any warranty or liability for your use of this information.

## 2018-06-14 LAB
BACTERIA SPEC CULT: NORMAL
SERVICE CMNT-IMP: NORMAL

## 2018-07-19 ENCOUNTER — HOSPITAL ENCOUNTER (EMERGENCY)
Age: 53
Discharge: HOME | End: 2018-07-19
Attending: EMERGENCY MEDICINE
Payer: SELF-PAY

## 2018-07-19 VITALS
DIASTOLIC BLOOD PRESSURE: 99 MMHG | OXYGEN SATURATION: 100 % | HEART RATE: 73 BPM | TEMPERATURE: 98.7 F | HEIGHT: 63 IN | RESPIRATION RATE: 16 BRPM | WEIGHT: 244.93 LBS | SYSTOLIC BLOOD PRESSURE: 204 MMHG | BODY MASS INDEX: 43.4 KG/M2

## 2018-07-19 DIAGNOSIS — L03.012 PARONYCHIA OF LEFT INDEX FINGER: Primary | ICD-10-CM

## 2018-07-19 PROCEDURE — 99282 EMERGENCY DEPT VISIT SF MDM: CPT

## 2018-07-19 RX ORDER — BACITRACIN 500 [USP'U]/G
OINTMENT TOPICAL
Qty: 1 TUBE | Refills: 0 | Status: SHIPPED | OUTPATIENT
Start: 2018-07-19 | End: 2018-09-29

## 2018-07-19 RX ORDER — CEPHALEXIN 500 MG/1
500 CAPSULE ORAL 3 TIMES DAILY
Qty: 21 CAP | Refills: 0 | Status: SHIPPED | OUTPATIENT
Start: 2018-07-19 | End: 2018-07-26

## 2018-07-19 RX ORDER — METFORMIN HYDROCHLORIDE 500 MG/1
500 TABLET, EXTENDED RELEASE ORAL
Qty: 30 TAB | Refills: 0 | Status: SHIPPED | OUTPATIENT
Start: 2018-07-19 | End: 2022-08-09

## 2018-07-19 RX ORDER — AMLODIPINE BESYLATE 5 MG/1
5 TABLET ORAL DAILY
Qty: 15 TAB | Refills: 0 | Status: SHIPPED | OUTPATIENT
Start: 2018-07-19 | End: 2022-08-09

## 2018-07-19 RX ORDER — HYDROCODONE BITARTRATE AND ACETAMINOPHEN 5; 325 MG/1; MG/1
1 TABLET ORAL
Qty: 10 TAB | Refills: 0 | Status: SHIPPED | OUTPATIENT
Start: 2018-07-19 | End: 2018-09-29

## 2018-07-19 NOTE — DISCHARGE INSTRUCTIONS
[facility] SYSTEMS Departments     For adult and child immunizations, family planning, TB screening, STD testing and women's health services. [facility]: [place] [phone]      [facility] 25   657 [facility]   [address]   170 [facility]: Carlos Carranza [address] [facility] [address]  [phone]      [address]          [address] [place] [address] [address]     For primary care services, woman and child wellness, and some clinics providing specialty care. [facility] -- [facility] [facility]. [facility]5 [facility] [phone]   411 [facility] 200 [facility] 3614 [facility] [phone]   339 [facility] [address] [facility] [address] [phone] [address]  [employer] [address] [place] [address] 5850  Community  [phone]   [address] [place] [phone]   Love of Mike [address] 69 81 [facility] [phone]   [facility] [address] [place] [phone]   [facility]: [facility] 700 [facility], [address] [facility] [phone]     Sushil [address] [facility] [address] [phone]   [facility] 5850 [facility]  [phone]   [employer]  [address], [address] ([email or web address]/about/mission. asp) [phone]         Sexual Health/Woman Wellness Clinics    For STD/HIV testing and treatment, pregnancy testing and services, men's health, birth control services, LGBT services, and hepatitis/HPV vaccine services. [employer] for [place] [employer] [address]. [facility] 75 [facility] [address] [facility] 1579 [address] [phone]   [facility] [address], 5th floor [phone]   Pregnancy [address] for Women [address]  Connor [address] [phone]         Specialty Service 1700 [facility]   [phone]   [place]   [phone]   Women, Infant and Children's Services: [address] [phone]       600 [facility]   [phone]   [address]   [facility]     [phone]   [address] Crisis   [address] [facility] [address] [phone]     Local Primary Care Physicians  [facility] [phone]  Kristopher Kite, MD Merlin Ehlers, MD Greta Maroon, MD [facility] Doctors [phone]  Frances Cai, P  MD Elvin Saleem MD Fredrica Staple, MD [address]  [phone]  Ranell Antonio, [place] [address] [facility] [phone]  Harinder Connors, MD Cheri Puentes, MD Demario Shah MD   [facility] [phone]  [facility] PGRKRX UO, MD Jaqui Aguilera, MD Megan Luevanoers, NP [facility] [phone]  Jenkins Klippel, MD Kela Oppenheim, MD Cinthia Cruise, MD Yoselin Gama, MD Pedrito Krause, MD Manfred Lockwood MD   [address] 57 [facility]  Bia Onofre MD [facility] [phone]  MD Bev Gonzales, NP  Ziggy Bansal, MD Black Gonzalez MD Onalee Pulse, MD Manjinder Edward MD   6672 [facility] [phone]  MD Luis Oliver, P  David Beauchamp, NP  Bonny De Santiago, MD Osmani Reddy, MD Kacy Awan MD [facility] [phone]  MD Neetu Ratliff MD Garlon Sons, MD Digna Hal, MD Welton Sciara, MD   [address] [phone]  MD Nataliia Church MD [facility] [phone]  MD Tommy Mir MD  Pastor Ask Marcia Fernandez, [address] [facility] [phone]  Jonathan Contreras, MD Gopi Almaguer, MD Arina Peck, MD Felisha Allen, MD Mary Jo Adorno, MD Tono Farooq, SLOANE Fernandez MD 1619 [facility]   [phone]  MD Kristofer Mera, MD Carl Verma MD   2102 [facility] [phone]  Bridger Reyes, MD Tommy Reese, FNP  Hallie Sauer, PA-C  Hallie Sauer, FNP  Estefania Perez, PA-TAJ Mirza, MD Lin Fernandes, NP   Sheng Browne, DO Miscellaneous:  Daphne Stuart [phone]

## 2018-07-19 NOTE — ED PROVIDER NOTES
EMERGENCY DEPARTMENT HISTORY AND PHYSICAL EXAM      Date: 7/19/2018  Patient Name: Racheal Schmitz    History of Presenting Illness     Chief Complaint   Patient presents with    Finger Pain     pain and swelling to left index finger since approx. 1230       History Provided By: Patient    HPI: Racheal Schmitz, 48 y.o. female presents ambulatory to the ED with cc of 1 day of 10 out of 10 constant, aching, throbbing tip of the left index finger pain, swelling and redness not relieved by OTC pain medication or attempts to poke with a sharp pin. No injury. No fever. Denies nail biting. Chief Complaint: left index finger pain, rednes and swelling  Duration: 1 Days  Timing:  Constant  Location: left index finger  Quality: Aching, Tightness and throbbing  Severity: 10 out of 10  Modifying Factors: worse with palpation  Associated Symptoms: denies any other associated signs or symptoms    There are no other complaints, changes, or physical findings at this time. PCP: None    Current Outpatient Prescriptions   Medication Sig Dispense Refill    bacitracin (BACITRACIN) 500 unit/gram oint Apply a small amount to the tip of the left index finger three times daily. Wash hands thoroughly before use. 1 Tube 0    cephALEXin (KEFLEX) 500 mg capsule Take 1 Cap by mouth three (3) times daily for 7 days. 21 Cap 0    HYDROcodone-acetaminophen (NORCO) 5-325 mg per tablet Take 1 Tab by mouth every four (4) hours as needed for Pain. Max Daily Amount: 6 Tabs. 10 Tab 0    metFORMIN ER (GLUCOPHAGE XR) 500 mg tablet Take 500 mg by mouth daily (with dinner).  aluminum-magnesium hydroxide 200-200 mg/5 mL susp 5 mL, diphenhydrAMINE 12.5 mg/5 mL liqd 12.5 mg, lidocaine 2 % soln 5 mL 5 mL by Swish and Spit route two (2) times daily as needed for Pain.  Magic mouth wash   Maalox  Lidocaine 2% viscous   Diphenhydramine oral solution     Pharmacy to mix equal portions of ingredients to a total volume as indicated in the dispense amount. 100 mL 0    HYDROcodone-acetaminophen (NORCO) 5-325 mg per tablet Take 1 Tab by mouth every six (6) hours as needed for Pain. Max Daily Amount: 4 Tabs. 8 Tab 0       Past History     Past Medical History:  Past Medical History:   Diagnosis Date    Asthma     Bronchitis     Diabetes ([address]     Type II, no insulin    Hypertension     No medication taken per patient       Past Surgical History:  Past Surgical History:   Procedure Laterality Date    HX GYN  1984 and 1992    C-sections    HX GYN      Uterine ablation       Family History:  No family history on file. Social History:  Social History   Substance Use Topics    Smoking status: Never Smoker    Smokeless tobacco: Never Used    Alcohol use No       Allergies: Allergies   Allergen Reactions    Latex Other (comments)     peeling         Review of Systems   Review of Systems   Constitutional: Negative for fatigue and fever. HENT: Negative for ear pain and sore throat. Eyes: Negative for pain, redness and visual disturbance. Respiratory: Negative for cough and shortness of breath. Cardiovascular: Negative for chest pain and palpitations. Gastrointestinal: Negative for abdominal pain, nausea and vomiting. Genitourinary: Negative for dysuria, frequency and urgency. Musculoskeletal: Negative for back pain, gait problem, neck pain and neck stiffness. Skin: Negative for rash and wound. Skin of the tip of the left index finger is painful and red   Neurological: Negative for dizziness, weakness, light-headedness, numbness and headaches. Physical Exam   Physical Exam   Constitutional: She is oriented to person, place, and time. She appears well-developed and well-nourished. Non-toxic appearance. No distress. HENT:   Head: Normocephalic and atraumatic. Right Ear: External ear normal.   Left Ear: External ear normal.   Nose: Nose normal.   Mouth/Throat: Uvula is midline. No trismus in the jaw.    Eyes: Conjunctivae and EOM are normal. Pupils are equal, round, and reactive to light. No scleral icterus. Neck: Normal range of motion and full passive range of motion without pain. Cardiovascular: Normal rate and regular rhythm. Pulmonary/Chest: Effort normal. No accessory muscle usage. No tachypnea. No respiratory distress. She has no decreased breath sounds. She has no wheezes. Abdominal: Soft. There is no tenderness. Musculoskeletal: Normal range of motion. Left hand: She exhibits tenderness and swelling. Hands:  Mild redness and swelling of the skin of the base of the nail without fluctuance. No pulp swelling or tenderness. Site is exquisitely painful to touch. Neurological: She is alert and oriented to person, place, and time. She is not disoriented. No cranial nerve deficit. GCS eye subscore is 4. GCS verbal subscore is 5. GCS motor subscore is 6. Skin: Skin is intact. No rash noted. Psychiatric: She has a normal mood and affect. Her speech is normal.   Nursing note and vitals reviewed. Diagnostic Study Results     Labs -   No results found for this or any previous visit (from the past 12 hour(s)). Radiologic Studies -   No orders to display     CT Results  (Last 48 hours)    None        CXR Results  (Last 48 hours)    None            Medical Decision Making   I am the first provider for this patient. I reviewed the vital signs, available nursing notes, past medical history, past surgical history, family history and social history. Vital Signs-Reviewed the patient's vital signs. Patient Vitals for the past 12 hrs:   Temp Pulse Resp BP SpO2   07/19/18 0017 98.7 °F (37.1 °C) 73 16 (!) 204/99 100 %       Records Reviewed: Nursing Notes, Old Medical Records, Previous Radiology Studies, Previous Laboratory Studies and     Provider Notes (Medical Decision Making):   DDx: paronychia, cellulitis, felon    ED Course:   Initial assessment performed.  The patients presenting problems have been discussed, and they are in agreement with the care plan formulated and outlined with them. I have encouraged them to ask questions as they arise throughout their visit. Disposition:  Discharge    PLAN:  1. Current Discharge Medication List      START taking these medications    Details   bacitracin (BACITRACIN) 500 unit/gram oint Apply a small amount to the tip of the left index finger three times daily. Wash hands thoroughly before use. Qty: 1 Tube, Refills: 0      cephALEXin (KEFLEX) 500 mg capsule Take 1 Cap by mouth three (3) times daily for 7 days. Qty: 21 Cap, Refills: 0      !! HYDROcodone-acetaminophen (NORCO) 5-325 mg per tablet Take 1 Tab by mouth every four (4) hours as needed for Pain. Max Daily Amount: 6 Tabs. Qty: 10 Tab, Refills: 0    Associated Diagnoses: Paronychia of left index finger       !! - Potential duplicate medications found. Please discuss with provider. CONTINUE these medications which have NOT CHANGED    Details   !! HYDROcodone-acetaminophen (NORCO) 5-325 mg per tablet Take 1 Tab by mouth every six (6) hours as needed for Pain. Max Daily Amount: 4 Tabs. Qty: 8 Tab, Refills: 0    Associated Diagnoses: Abscess of groin, left       !! - Potential duplicate medications found. Please discuss with provider. 2.   Follow-up Information     Follow up With Details Comments [address] Schedule an appointment as soon as possible for a visit PRIMARY CARE: call to schedule follow up [address] [address] [phone]        Return to ED if worse     Diagnosis     Clinical Impression:   1.  Paronychia of left index finger

## 2018-09-29 ENCOUNTER — HOSPITAL ENCOUNTER (EMERGENCY)
Age: 53
Discharge: HOME | End: 2018-09-29
Attending: EMERGENCY MEDICINE
Payer: SELF-PAY

## 2018-09-29 VITALS
DIASTOLIC BLOOD PRESSURE: 85 MMHG | RESPIRATION RATE: 18 BRPM | BODY MASS INDEX: 42.52 KG/M2 | WEIGHT: 240 LBS | HEART RATE: 88 BPM | HEIGHT: 63 IN | OXYGEN SATURATION: 96 % | SYSTOLIC BLOOD PRESSURE: 153 MMHG | TEMPERATURE: 98.2 F

## 2018-09-29 DIAGNOSIS — S46.819D STRAIN OF TRAPEZIUS MUSCLE, UNSPECIFIED LATERALITY, SUBSEQUENT ENCOUNTER: Primary | ICD-10-CM
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
## 2018-09-29 PROCEDURE — 96372 THER/PROPH/DIAG INJ SC/IM: CPT

## 2018-09-29 PROCEDURE — 74011250636 HC RX REV CODE- 250/636: Performed by: EMERGENCY MEDICINE

## 2018-09-29 PROCEDURE — 99282 EMERGENCY DEPT VISIT SF MDM: CPT

## 2018-09-29 RX ORDER — LORATADINE 10 MG/1
10 TABLET ORAL
COMMUNITY
End: 2022-08-09

## 2018-09-29 RX ORDER — KETOROLAC TROMETHAMINE 30 MG/ML
60 INJECTION, SOLUTION INTRAMUSCULAR; INTRAVENOUS
Status: COMPLETED | OUTPATIENT
Start: 2018-09-29 | End: 2018-09-29

## 2018-09-29 RX ORDER — IBUPROFEN 800 MG/1
800 TABLET ORAL
Qty: 30 TAB | Refills: 0 | Status: SHIPPED | OUTPATIENT
Start: 2018-09-29

## 2018-09-29 RX ORDER — DIAZEPAM 5 MG/1
5 TABLET ORAL
Qty: 15 TAB | Refills: 0 | Status: SHIPPED | OUTPATIENT
Start: 2018-09-29

## 2018-09-29 RX ORDER — ALBUTEROL SULFATE 90 UG/1
AEROSOL, METERED RESPIRATORY (INHALATION)
COMMUNITY

## 2018-09-29 RX ORDER — FLUTICASONE PROPIONATE 50 MCG
2 SPRAY, SUSPENSION (ML) NASAL DAILY
COMMUNITY

## 2018-09-29 RX ADMIN — KETOROLAC TROMETHAMINE 60 MG: 30 INJECTION INTRAMUSCULAR; INTRAVENOUS at 20:21
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
## 2018-09-30 NOTE — ED NOTES
Discharge instructions were given to the patient by Jenni Angela RN and provider. The patient left the Emergency Department ambulatory, alert and oriented and in no acute distress with 2 prescriptions. The patient was encouraged to call or return to the ED for worsening issues or problems and was encouraged to schedule a follow up appointment for continuing care. The patient verbalized understanding of discharge instructions and prescriptions, all questions were answered. The patient has no further concerns at this time.

## 2022-08-08 ENCOUNTER — HOSPITAL ENCOUNTER (EMERGENCY)
Age: 57
Discharge: HOME | End: 2022-08-09
Attending: EMERGENCY MEDICINE
Payer: MEDICAID

## 2022-08-08 DIAGNOSIS — I10 HYPERTENSION, UNSPECIFIED TYPE: Primary | ICD-10-CM

## 2022-08-08 DIAGNOSIS — B37.9 YEAST INFECTION: ICD-10-CM

## 2022-08-08 DIAGNOSIS — R73.9 HYPERGLYCEMIA: ICD-10-CM

## 2022-08-08 PROCEDURE — 99283 EMERGENCY DEPT VISIT LOW MDM: CPT

## 2022-08-08 NOTE — Clinical Note
[address]  [facility] EMERGENCY DEPT  [address] [address]  [phone]    Work/School Note    Date: 8/8/2022    To Whom It May concern:      Christiano Santillan was seen and treated today in the emergency room by the following provider(s):  Attending Provider: Hugo Hawthorne MD.      Christiano Santillan is excused from work/school on 08/09/22. She is clear to return to work/school on 08/10/22.         Sincerely,          Wendall Nyhan, MD

## 2022-08-08 NOTE — Clinical Note
[address]  \[facility]\"" EMERGENCY DEPT  [address] [facility] [address]  [phone]    Work/School Note    Date: 8/8/2022    To Whom It May concern:      Hannah Billingsley was seen and treated today in the emergency room by the following provider(s):  Attending Provider: Becky Solitario MD.      Hannah Billingsley is excused from work/school on 08/09/22. She is clear to return to work/school on 08/10/22.         Sincerely,          John Shahid MD

## 2022-08-09 VITALS
WEIGHT: 231.48 LBS | BODY MASS INDEX: 41.02 KG/M2 | OXYGEN SATURATION: 98 % | HEIGHT: 63 IN | HEART RATE: 80 BPM | DIASTOLIC BLOOD PRESSURE: 83 MMHG | SYSTOLIC BLOOD PRESSURE: 196 MMHG | TEMPERATURE: 98.2 F | RESPIRATION RATE: 18 BRPM

## 2022-08-09 LAB
GLUCOSE BLD STRIP.AUTO-MCNC: 346 MG/DL (ref 65–117)
SERVICE CMNT-IMP: ABNORMAL

## 2022-08-09 PROCEDURE — 74011250637 HC RX REV CODE- 250/637: Performed by: EMERGENCY MEDICINE

## 2022-08-09 PROCEDURE — 82962 GLUCOSE BLOOD TEST: CPT

## 2022-08-09 RX ORDER — FLUCONAZOLE 150 MG/1
150 TABLET ORAL
Qty: 1 TABLET | Refills: 0 | Status: SHIPPED | OUTPATIENT
Start: 2022-08-09 | End: 2022-08-09

## 2022-08-09 RX ORDER — AMLODIPINE BESYLATE 5 MG/1
5 TABLET ORAL
Status: COMPLETED | OUTPATIENT
Start: 2022-08-09 | End: 2022-08-09

## 2022-08-09 RX ORDER — FLUCONAZOLE 100 MG/1
150 TABLET ORAL
Status: COMPLETED | OUTPATIENT
Start: 2022-08-09 | End: 2022-08-09

## 2022-08-09 RX ORDER — METFORMIN HYDROCHLORIDE 500 MG/1
500 TABLET, EXTENDED RELEASE ORAL
Qty: 30 TABLET | Refills: 0 | Status: SHIPPED | OUTPATIENT
Start: 2022-08-09

## 2022-08-09 RX ORDER — AMLODIPINE BESYLATE 5 MG/1
5 TABLET ORAL DAILY
Qty: 30 TABLET | Refills: 0 | Status: SHIPPED | OUTPATIENT
Start: 2022-08-09 | End: 2022-09-08

## 2022-08-09 RX ORDER — NYSTATIN 100000 [USP'U]/ML
200000 SUSPENSION ORAL 4 TIMES DAILY
Qty: 112 ML | Refills: 0 | Status: SHIPPED | OUTPATIENT
Start: 2022-08-09 | End: 2022-08-23

## 2022-08-09 RX ADMIN — FLUCONAZOLE 150 MG: 100 TABLET ORAL at 00:46

## 2022-08-09 RX ADMIN — AMLODIPINE BESYLATE 5 MG: 5 TABLET ORAL at 00:46

## 2022-08-09 NOTE — ED PROVIDER NOTES
EMERGENCY DEPARTMENT HISTORY AND PHYSICAL EXAM      Date: 8/8/2022  Patient Name: Matt Arrington    History of Presenting Illness     Chief Complaint   Patient presents with    Hypertension     Pt presents w/ c/o elevated BP. Pt no longer taken BP meds          History Provided By: Patient    HPI: Matt Arrington, 56 y.o. female with PMHx significant for obesity hypertension, \"prediabetes\" presents to the ED with elevated blood pressure. Patient denies any symptoms and went for a physical for her job earlier today. Blood pressure noted to be 208/113 at her physical today. Patient denies headache, chest pain, shortness of breath, numbness, tingling, weakness. Patient reports she used to take blood pressure medicines, but stopped taking them around the time of COVID. She also reports that she used to be on metformin for \"prediabetes\" but decided not to take it anymore because it was only \"pre-\" diabetes. PCP: None    No current facility-administered medications on file prior to encounter. Current Outpatient Medications on File Prior to Encounter   Medication Sig Dispense Refill    fluticasone (FLONASE ALLERGY RELIEF) 50 mcg/actuation nasal spray 2 Sprays by Both Nostrils route daily. albuterol (PROVENTIL HFA, VENTOLIN HFA, PROAIR HFA) 90 mcg/actuation inhaler Take  by inhalation. loratadine (CLARITIN) 10 mg tablet Take 10 mg by mouth. ibuprofen (MOTRIN) 800 mg tablet Take 1 Tab by mouth every eight (8) hours as needed for Pain. 30 Tab 0    diazePAM (VALIUM) 5 mg tablet Take 1 Tab by mouth every eight (8) hours as needed (spasm). Max Daily Amount: 15 mg. 15 Tab 0    metFORMIN ER (GLUCOPHAGE XR) 500 mg tablet Take 1 Tab by mouth daily (with dinner). 30 Tab 0    amLODIPine (NORVASC) 5 mg tablet Take 1 Tab by mouth daily.  15 Tab 0       Past History     Past Medical History:  Past Medical History:   Diagnosis Date    Asthma     Bronchitis     Diabetes ([facility] [address]     Type II, no insulin Hypertension     No medication taken per patient       Past Surgical History:  Past Surgical History:   Procedure Laterality Date    HX GYN  1984 and 1992    C-sections    HX GYN      Uterine ablation       Family History:  No family history on file. Social History:  Social History     Tobacco Use    Smoking status: Never    Smokeless tobacco: Never   Substance Use Topics    Alcohol use: No    Drug use: No       Allergies: Allergies   Allergen Reactions    Latex Other (comments)     peeling         Review of Systems   Review of Systems   Constitutional:  Negative for chills and fever. HENT: Negative. Respiratory:  Negative for chest tightness and shortness of breath. Cardiovascular:  Negative for chest pain, palpitations and leg swelling. Gastrointestinal:  Negative for abdominal pain, diarrhea, nausea and vomiting. Genitourinary:  Negative for dysuria and flank pain. Musculoskeletal:  Negative for back pain, myalgias and neck pain. Skin:  Negative for rash and wound. Neurological:  Negative for dizziness, syncope, light-headedness and headaches. Psychiatric/Behavioral:  Negative for confusion. The patient is nervous/anxious. All other systems reviewed and are negative.       Physical Exam   General appearance -overweight, well appearing, and in no distress  Eyes - pupils equal and reactive, extraocular eye movements intact  ENT - mucous membranes moist, pharynx normal without lesions  Neck - supple, no significant adenopathy; non-tender to palpation  Chest - clear to auscultation, no wheezes, rales or rhonchi; non-tender to palpation  Heart - normal rate and regular rhythm, S1 and S2 normal, no murmurs noted  Abdomen - soft, nontender, nondistended, no masses or organomegaly  Musculoskeletal - no joint tenderness, deformity or swelling; normal ROM  Extremities - peripheral pulses normal, no pedal edema  Skin - normal coloration and turgor, no rashes  Neurological - alert, oriented x3, normal speech, no focal findings or movement disorder noted    Diagnostic Study Results     Labs -     Recent Results (from the past 12 hour(s))   GLUCOSE, POC    Collection Time: 08/09/22 12:08 AM   Result Value Ref Range    Glucose (POC) 346 (H) 65 - 117 mg/dL    Performed by Shawanda TYSON        Radiologic Studies -   No orders to display     CT Results  (Last 48 hours)      None          CXR Results  (Last 48 hours)      None              Medical Decision Making   I am the first provider for this patient. I reviewed the vital signs, available nursing notes, past medical history, past surgical history, family history and social history. Vital Signs-Reviewed the patient's vital signs. Patient Vitals for the past 12 hrs:   Temp Pulse Resp BP SpO2   08/08/22 2343 -- -- -- (!) 203/88 --   08/08/22 2328 -- -- -- (!) 197/93 --   08/08/22 2313 -- -- -- (!) 241/118 --   08/08/22 2258 -- -- -- (!) 226/91 --   08/08/22 2109 98.2 °F (36.8 °C) 80 18 (!) 208/89 98 %           Records Reviewed: Nursing Notes and Old Medical Records    Provider Notes (Medical Decision Making):   Patient presents with asymptomatic hypertension requesting refill of her previously prescribed blood pressure medication as she does not have any at home. She also tells me she is not taking her diabetes medications. We will check blood glucose. ED Course:   Initial assessment performed. The patients presenting problems have been discussed, and they are in agreement with the care plan formulated and outlined with them. I have encouraged them to ask questions as they arise throughout their visit. Progress Notes:  Blood sugar is elevated at 346. Will treat with Norvasc and metformin which were her previously prescribed medications that she is out of. Will encourage close follow-up with PCP.   Return to ED f as needed if patient develops severe headache, chest pain, shortness of breath, dizziness, numbness, tingling, weakness or any other new symptoms. Disposition:  Discharge home    PLAN:  1. Current Discharge Medication List        CONTINUE these medications which have CHANGED    Details   metFORMIN ER (GLUCOPHAGE XR) 500 mg tablet Take 1 Tablet by mouth daily (with dinner). Qty: 30 Tablet, Refills: 0  Start date: 8/9/2022      amLODIPine (NORVASC) 5 mg tablet Take 1 Tablet by mouth in the morning for 30 days. Qty: 30 Tablet, Refills: 0  Start date: 8/9/2022, End date: 9/8/2022           2. Follow-up Information       Follow up With Specialties Details Why Contact Info    [facility] EMERGENCY DEPT Emergency Medicine  If symptoms worsen [address]  [address] [facility]  [phone]    Your PCP at [facility] or one of the primary care doctors from the list provided  Schedule an appointment as soon as possible for a visit             Return to ED if worse     Diagnosis     Clinical Impression:   1. Hypertension, unspecified type    2.  Hyperglycemia

## 2022-08-09 NOTE — DISCHARGE INSTRUCTIONS
[facility] SYSTEMS Departments     For adult and child immunizations, family planning, TB screening, STD testing and women's health services. [facility]: [place] [phone]      [facility] 25   657 [facility]   [address]   170 [facility]: Kathryn Clifton 200 [facility] [address]  [phone]      [address]          [address] [place] [address]     For primary care services, woman and child wellness, and some clinics providing specialty care. [facility] -- [address] [facility] 2525 [facility] [phone]   411 [facility] 200 [facility] 3614 [facility] [phone]   339 [facility] [address] [facility] [address] [phone]   [address] [facility] Systems [address] [place] [address] 5850  Community  [phone]   [address] [phone]   [facility] 81 [facility] [phone]   [facility] [address] [place] [address] [phone]   [facility]: [facility] 700 [address], ext [address] [place] [address], [address] [phone]     [address] [phone]   [facility] Outreach 5850  Community  [phone]   [employer]  [address], [address] ([email or web address]) [phone]         Sexual Health/Woman Wellness Clinics    For STD/HIV testing and treatment, pregnancy testing and services, men's health, birth control services, LGBT services, and hepatitis/HPV vaccine services. [place] [employer] for [place] [employer] 201 [address]. [facility] 75 [facility] [address] [facility] 1579 600 SUELL[address] [phone]   [facility] [address], 5th floor [phone]   Pregnancy [address] for Women [address]  Levada Guest [phone]         Specialty Service 1701 [facility]   [phone]   [place]   [phone]   Women, Infant and Children's Services: [address] [phone]       600 [facility]   [phone]   [address] 1051 [facility] Psychiat[facility]     [phone]   [address] Crisis   [address] [phone]       Local Primary Care Physicians  [facility] [phone]  MD Arthur Anthony MD Jocelyne Pinal, MD [facility] Doctors [phone]  Tim Michael, P  Mortimer Knock, MD Stephanie Amaya, MD Timo [address] [place] [address] [phone]  Sridevi Driver, MD Lino Rodriguez [place] [address] [facility] [phone]  Maria G Barton, MD Angela Calabrese, MD Lacie Oppenheim, MD Hermelindo Valentino MD   [facility] [phone]  PI AQLINDSAY WREN, MD Brianna Carreon, MD Alie Saab, NP [address] [phone]  MD Julito Jones MD Glynda Hoehn, MD Doug Kawasaki, MD Manuel Dinh, MD Lexx Garcia MD   33 57 [facility]  [facility] MD [address] [phone]  MD Radha Roblero, NP  Lawrence Stanford, MD Michael Lagunas MD Karoline Ahmadi, MD Larue Pac, MD   6777 [facility] [phone]  Gege Holland, MD Manolo Clniton, P  Adina Almanza, NP  Genie Chen, MD Svitlana Shaw, MD GISELLE Hern[facility]dez [facility] [phone]  Slime Villa, MD Frank Dalal, MD Chitra Warren, MD Phil Sorto MD   [address] [phone]  MD Lakeshia Bradley MD [facility] [phone]  MD Yue Dyer MD Rachele Reap [address], [address] [facility] [phone]  Abhilash Dias, MD Ely Vega, MD Marie Morgan, MD Celeste Luu, MD Shanna Mckenna, MD Diann Fried, SLOANE Manzo MD 1619 [facility]   [phone]  [facility]ona, MD Alvarez Cifuentes MD   2102 [facility] [phone]  Bridger Reyes, MD Brynn Sultana, FNP  Deo Ramos, PA-C  Deo Ramos, FNP  Briseyda Waldrop, TAPAN Zhang, MD Beverley Fothergill, SLOANE Fisher, DO Miscellaneous:  Antonio Pillai [phone]

## 2023-02-26 ENCOUNTER — HOSPITAL ENCOUNTER (INPATIENT)
Age: 58
LOS: 2 days | Discharge: HOME | DRG: 199 | End: 2023-02-28
Attending: EMERGENCY MEDICINE | Admitting: INTERNAL MEDICINE
Payer: MEDICAID

## 2023-02-26 ENCOUNTER — APPOINTMENT (OUTPATIENT)
Dept: GENERAL RADIOLOGY | Age: 58
DRG: 199 | End: 2023-02-26
Attending: NURSE PRACTITIONER
Payer: MEDICAID

## 2023-02-26 ENCOUNTER — APPOINTMENT (OUTPATIENT)
Dept: CT IMAGING | Age: 58
DRG: 199 | End: 2023-02-26
Attending: NURSE PRACTITIONER
Payer: MEDICAID

## 2023-02-26 DIAGNOSIS — E11.49 DM (DIABETES MELLITUS), TYPE 2 WITH NEUROLOGICAL COMPLICATIONS (HCC): ICD-10-CM

## 2023-02-26 DIAGNOSIS — I10 PRIMARY HYPERTENSION: Primary | ICD-10-CM

## 2023-02-26 DIAGNOSIS — R73.9 HYPERGLYCEMIA: ICD-10-CM

## 2023-02-26 DIAGNOSIS — R79.89 ELEVATED BRAIN NATRIURETIC PEPTIDE (BNP) LEVEL: ICD-10-CM

## 2023-02-26 DIAGNOSIS — R07.9 CHEST PAIN, UNSPECIFIED TYPE: ICD-10-CM

## 2023-02-26 PROBLEM — R51.9 BILATERAL HEADACHES: Status: ACTIVE | Noted: 2023-02-26

## 2023-02-26 PROBLEM — M17.12 OSTEOARTHRITIS OF LEFT KNEE: Status: ACTIVE | Noted: 2023-02-26

## 2023-02-26 PROBLEM — J45.20 ASTHMA, MILD INTERMITTENT: Status: ACTIVE | Noted: 2023-02-26

## 2023-02-26 PROBLEM — G47.33 OSA (OBSTRUCTIVE SLEEP APNEA): Status: ACTIVE | Noted: 2023-02-26

## 2023-02-26 PROBLEM — I44.7 LBBB (LEFT BUNDLE BRANCH BLOCK): Status: ACTIVE | Noted: 2023-02-26

## 2023-02-26 LAB
ALBUMIN SERPL-MCNC: 3.5 G/DL (ref 3.5–5)
ALBUMIN/GLOB SERPL: 0.8 (ref 1.1–2.2)
ALP SERPL-CCNC: 169 U/L (ref 45–117)
ALT SERPL-CCNC: 32 U/L (ref 12–78)
AMPHET UR QL SCN: NEGATIVE
ANION GAP SERPL CALC-SCNC: 6 MMOL/L (ref 5–15)
APPEARANCE UR: CLEAR
AST SERPL-CCNC: 26 U/L (ref 15–37)
BACTERIA URNS QL MICRO: NEGATIVE /HPF
BARBITURATES UR QL SCN: NEGATIVE
BASOPHILS # BLD: 0 K/UL (ref 0–0.1)
BASOPHILS NFR BLD: 0 % (ref 0–1)
BENZODIAZ UR QL: NEGATIVE
BILIRUB SERPL-MCNC: 0.4 MG/DL (ref 0.2–1)
BILIRUB UR QL: NEGATIVE
BNP SERPL-MCNC: 311 PG/ML
BUN SERPL-MCNC: 12 MG/DL (ref 6–20)
BUN/CREAT SERPL: 14 (ref 12–20)
CALCIUM SERPL-MCNC: 9.2 MG/DL (ref 8.5–10.1)
CANNABINOIDS UR QL SCN: NEGATIVE
CHLORIDE SERPL-SCNC: 100 MMOL/L (ref 97–108)
CO2 SERPL-SCNC: 30 MMOL/L (ref 21–32)
COCAINE UR QL SCN: NEGATIVE
COLOR UR: ABNORMAL
COMMENT, HOLDF: NORMAL
COMMENT, HOLDF: NORMAL
CREAT SERPL-MCNC: 0.88 MG/DL (ref 0.55–1.02)
DIFFERENTIAL METHOD BLD: ABNORMAL
DRUG SCRN COMMENT,DRGCM: NORMAL
EOSINOPHIL # BLD: 0.1 K/UL (ref 0–0.4)
EOSINOPHIL NFR BLD: 1 % (ref 0–7)
EPITH CASTS URNS QL MICRO: ABNORMAL /LPF
ERYTHROCYTE [DISTWIDTH] IN BLOOD BY AUTOMATED COUNT: 12.1 % (ref 11.5–14.5)
GLOBULIN SER CALC-MCNC: 4.5 G/DL (ref 2–4)
GLUCOSE BLD STRIP.AUTO-MCNC: 265 MG/DL (ref 65–117)
GLUCOSE BLD STRIP.AUTO-MCNC: 315 MG/DL (ref 65–117)
GLUCOSE SERPL-MCNC: 350 MG/DL (ref 65–100)
GLUCOSE UR STRIP.AUTO-MCNC: >1000 MG/DL
HCT VFR BLD AUTO: 46.7 % (ref 35–47)
HGB BLD-MCNC: 14.8 G/DL (ref 11.5–16)
HGB UR QL STRIP: NEGATIVE
HYALINE CASTS URNS QL MICRO: ABNORMAL /LPF (ref 0–2)
IMM GRANULOCYTES # BLD AUTO: 0 K/UL (ref 0–0.04)
IMM GRANULOCYTES NFR BLD AUTO: 0 % (ref 0–0.5)
KETONES UR QL STRIP.AUTO: NEGATIVE MG/DL
LEUKOCYTE ESTERASE UR QL STRIP.AUTO: NEGATIVE
LIPASE SERPL-CCNC: 101 U/L (ref 73–393)
LYMPHOCYTES # BLD: 2.3 K/UL (ref 0.8–3.5)
LYMPHOCYTES NFR BLD: 32 % (ref 12–49)
MCH RBC QN AUTO: 27.3 PG (ref 26–34)
MCHC RBC AUTO-ENTMCNC: 31.7 G/DL (ref 30–36.5)
MCV RBC AUTO: 86.2 FL (ref 80–99)
METHADONE UR QL: NEGATIVE
MONOCYTES # BLD: 0.7 K/UL (ref 0–1)
MONOCYTES NFR BLD: 10 % (ref 5–13)
NEUTS SEG # BLD: 4.1 K/UL (ref 1.8–8)
NEUTS SEG NFR BLD: 57 % (ref 32–75)
NITRITE UR QL STRIP.AUTO: NEGATIVE
NRBC # BLD: 0 K/UL (ref 0–0.01)
NRBC BLD-RTO: 0 PER 100 WBC
OPIATES UR QL: NEGATIVE
PCP UR QL: NEGATIVE
PH UR STRIP: 5.5 (ref 5–8)
PLATELET # BLD AUTO: 225 K/UL (ref 150–400)
PMV BLD AUTO: 10.8 FL (ref 8.9–12.9)
POTASSIUM SERPL-SCNC: 3.7 MMOL/L (ref 3.5–5.1)
PROT SERPL-MCNC: 8 G/DL (ref 6.4–8.2)
PROT UR STRIP-MCNC: NEGATIVE MG/DL
RBC # BLD AUTO: 5.42 M/UL (ref 3.8–5.2)
RBC #/AREA URNS HPF: ABNORMAL /HPF (ref 0–5)
SAMPLES BEING HELD,HOLD: NORMAL
SAMPLES BEING HELD,HOLD: NORMAL
SERVICE CMNT-IMP: ABNORMAL
SERVICE CMNT-IMP: ABNORMAL
SODIUM SERPL-SCNC: 136 MMOL/L (ref 136–145)
SP GR UR REFRACTOMETRY: <1.005 (ref 1–1.03)
TROPONIN I SERPL HS-MCNC: 24 NG/L (ref 0–51)
UROBILINOGEN UR QL STRIP.AUTO: 1 EU/DL (ref 0.2–1)
WBC # BLD AUTO: 7.2 K/UL (ref 3.6–11)
WBC URNS QL MICRO: ABNORMAL /HPF (ref 0–4)

## 2023-02-26 PROCEDURE — 80307 DRUG TEST PRSMV CHEM ANLYZR: CPT

## 2023-02-26 PROCEDURE — 36415 COLL VENOUS BLD VENIPUNCTURE: CPT

## 2023-02-26 PROCEDURE — 74011000250 HC RX REV CODE- 250: Performed by: INTERNAL MEDICINE

## 2023-02-26 PROCEDURE — 74011250636 HC RX REV CODE- 250/636: Performed by: NURSE PRACTITIONER

## 2023-02-26 PROCEDURE — 83880 ASSAY OF NATRIURETIC PEPTIDE: CPT

## 2023-02-26 PROCEDURE — 80053 COMPREHEN METABOLIC PANEL: CPT

## 2023-02-26 PROCEDURE — 83036 HEMOGLOBIN GLYCOSYLATED A1C: CPT

## 2023-02-26 PROCEDURE — 93005 ELECTROCARDIOGRAM TRACING: CPT

## 2023-02-26 PROCEDURE — 83690 ASSAY OF LIPASE: CPT

## 2023-02-26 PROCEDURE — 71046 X-RAY EXAM CHEST 2 VIEWS: CPT

## 2023-02-26 PROCEDURE — 65270000046 HC RM TELEMETRY

## 2023-02-26 PROCEDURE — 82962 GLUCOSE BLOOD TEST: CPT

## 2023-02-26 PROCEDURE — 74011250637 HC RX REV CODE- 250/637: Performed by: INTERNAL MEDICINE

## 2023-02-26 PROCEDURE — 70450 CT HEAD/BRAIN W/O DYE: CPT

## 2023-02-26 PROCEDURE — 85025 COMPLETE CBC W/AUTO DIFF WBC: CPT

## 2023-02-26 PROCEDURE — 81001 URINALYSIS AUTO W/SCOPE: CPT

## 2023-02-26 PROCEDURE — 65270000029 HC RM PRIVATE

## 2023-02-26 PROCEDURE — 74011636637 HC RX REV CODE- 636/637: Performed by: INTERNAL MEDICINE

## 2023-02-26 PROCEDURE — 74011250637 HC RX REV CODE- 250/637: Performed by: NURSE PRACTITIONER

## 2023-02-26 PROCEDURE — 99285 EMERGENCY DEPT VISIT HI MDM: CPT

## 2023-02-26 PROCEDURE — 84484 ASSAY OF TROPONIN QUANT: CPT

## 2023-02-26 PROCEDURE — 96374 THER/PROPH/DIAG INJ IV PUSH: CPT

## 2023-02-26 RX ORDER — LISINOPRIL 20 MG/1
20 TABLET ORAL DAILY
Status: DISCONTINUED | OUTPATIENT
Start: 2023-02-26 | End: 2023-02-27

## 2023-02-26 RX ORDER — SENNOSIDES 8.6 MG/1
1 TABLET ORAL DAILY PRN
Status: DISCONTINUED | OUTPATIENT
Start: 2023-02-26 | End: 2023-02-28 | Stop reason: HOSPADM

## 2023-02-26 RX ORDER — LABETALOL HCL 20 MG/4 ML
20 SYRINGE (ML) INTRAVENOUS ONCE
Status: COMPLETED | OUTPATIENT
Start: 2023-02-26 | End: 2023-02-26

## 2023-02-26 RX ORDER — SODIUM CHLORIDE 0.9 % (FLUSH) 0.9 %
5-40 SYRINGE (ML) INJECTION EVERY 8 HOURS
Status: DISCONTINUED | OUTPATIENT
Start: 2023-02-26 | End: 2023-02-28 | Stop reason: HOSPADM

## 2023-02-26 RX ORDER — AMLODIPINE BESYLATE 5 MG/1
5 TABLET ORAL
Status: DISCONTINUED | OUTPATIENT
Start: 2023-02-26 | End: 2023-02-26

## 2023-02-26 RX ORDER — AMLODIPINE BESYLATE 10 MG/1
10 TABLET ORAL DAILY
Qty: 30 TABLET | Refills: 0 | Status: SHIPPED | OUTPATIENT
Start: 2023-02-26 | End: 2023-02-28

## 2023-02-26 RX ORDER — IPRATROPIUM BROMIDE AND ALBUTEROL SULFATE 2.5; .5 MG/3ML; MG/3ML
3 SOLUTION RESPIRATORY (INHALATION)
Status: DISCONTINUED | OUTPATIENT
Start: 2023-02-26 | End: 2023-02-28 | Stop reason: HOSPADM

## 2023-02-26 RX ORDER — DEXTROSE MONOHYDRATE 100 MG/ML
0-250 INJECTION, SOLUTION INTRAVENOUS AS NEEDED
Status: DISCONTINUED | OUTPATIENT
Start: 2023-02-26 | End: 2023-02-28 | Stop reason: HOSPADM

## 2023-02-26 RX ORDER — INSULIN GLARGINE 100 [IU]/ML
0.2 INJECTION, SOLUTION SUBCUTANEOUS
Status: DISCONTINUED | OUTPATIENT
Start: 2023-02-26 | End: 2023-02-27

## 2023-02-26 RX ORDER — INSULIN GLARGINE 100 [IU]/ML
0.2 INJECTION, SOLUTION SUBCUTANEOUS DAILY
Status: DISCONTINUED | OUTPATIENT
Start: 2023-02-26 | End: 2023-02-26

## 2023-02-26 RX ORDER — ACETAMINOPHEN 325 MG/1
650 TABLET ORAL
Status: DISCONTINUED | OUTPATIENT
Start: 2023-02-26 | End: 2023-02-28 | Stop reason: HOSPADM

## 2023-02-26 RX ORDER — PROMETHAZINE HYDROCHLORIDE 25 MG/1
12.5 TABLET ORAL
Status: DISCONTINUED | OUTPATIENT
Start: 2023-02-26 | End: 2023-02-28 | Stop reason: HOSPADM

## 2023-02-26 RX ORDER — ENOXAPARIN SODIUM 100 MG/ML
30 INJECTION SUBCUTANEOUS EVERY 12 HOURS
Status: DISCONTINUED | OUTPATIENT
Start: 2023-02-26 | End: 2023-02-28 | Stop reason: HOSPADM

## 2023-02-26 RX ORDER — POLYETHYLENE GLYCOL 3350 17 G/17G
17 POWDER, FOR SOLUTION ORAL DAILY PRN
Status: DISCONTINUED | OUTPATIENT
Start: 2023-02-26 | End: 2023-02-28 | Stop reason: HOSPADM

## 2023-02-26 RX ORDER — AMLODIPINE BESYLATE 5 MG/1
10 TABLET ORAL DAILY
Status: DISCONTINUED | OUTPATIENT
Start: 2023-02-26 | End: 2023-02-28 | Stop reason: HOSPADM

## 2023-02-26 RX ORDER — HYDRALAZINE HYDROCHLORIDE 20 MG/ML
20 INJECTION INTRAMUSCULAR; INTRAVENOUS
Status: DISCONTINUED | OUTPATIENT
Start: 2023-02-26 | End: 2023-02-28 | Stop reason: HOSPADM

## 2023-02-26 RX ORDER — IBUPROFEN 200 MG
4 TABLET ORAL AS NEEDED
Status: DISCONTINUED | OUTPATIENT
Start: 2023-02-26 | End: 2023-02-28 | Stop reason: HOSPADM

## 2023-02-26 RX ORDER — ACETAMINOPHEN 650 MG/1
650 SUPPOSITORY RECTAL
Status: DISCONTINUED | OUTPATIENT
Start: 2023-02-26 | End: 2023-02-28 | Stop reason: HOSPADM

## 2023-02-26 RX ORDER — SODIUM CHLORIDE 0.9 % (FLUSH) 0.9 %
5-40 SYRINGE (ML) INJECTION AS NEEDED
Status: DISCONTINUED | OUTPATIENT
Start: 2023-02-26 | End: 2023-02-28 | Stop reason: HOSPADM

## 2023-02-26 RX ORDER — INSULIN LISPRO 100 [IU]/ML
INJECTION, SOLUTION INTRAVENOUS; SUBCUTANEOUS
Status: DISCONTINUED | OUTPATIENT
Start: 2023-02-26 | End: 2023-02-28 | Stop reason: HOSPADM

## 2023-02-26 RX ORDER — AMLODIPINE BESYLATE 5 MG/1
10 TABLET ORAL ONCE
Status: COMPLETED | OUTPATIENT
Start: 2023-02-26 | End: 2023-02-26

## 2023-02-26 RX ORDER — ONDANSETRON 2 MG/ML
4 INJECTION INTRAMUSCULAR; INTRAVENOUS
Status: DISCONTINUED | OUTPATIENT
Start: 2023-02-26 | End: 2023-02-28 | Stop reason: HOSPADM

## 2023-02-26 RX ADMIN — Medication 10 ML: at 17:59

## 2023-02-26 RX ADMIN — ACETAMINOPHEN 650 MG: 325 TABLET ORAL at 20:58

## 2023-02-26 RX ADMIN — AMLODIPINE BESYLATE 10 MG: 5 TABLET ORAL at 18:42

## 2023-02-26 RX ADMIN — INSULIN GLARGINE 22 UNITS: 100 INJECTION, SOLUTION SUBCUTANEOUS at 22:01

## 2023-02-26 RX ADMIN — INSULIN LISPRO 7 UNITS: 100 INJECTION, SOLUTION INTRAVENOUS; SUBCUTANEOUS at 18:42

## 2023-02-26 RX ADMIN — LABETALOL HYDROCHLORIDE 20 MG: 5 INJECTION, SOLUTION INTRAVENOUS at 17:12

## 2023-02-26 RX ADMIN — Medication 10 ML: at 22:00

## 2023-02-26 RX ADMIN — AMLODIPINE BESYLATE 10 MG: 5 TABLET ORAL at 15:23

## 2023-02-26 RX ADMIN — LISINOPRIL 20 MG: 20 TABLET ORAL at 18:42

## 2023-02-26 RX ADMIN — INSULIN LISPRO 10 UNITS: 100 INJECTION, SOLUTION INTRAVENOUS; SUBCUTANEOUS at 22:01

## 2023-02-26 NOTE — H&P
[facility] [address]  [phone]    [facility] Medicine History and Physical      NAME:       Laurie Barajas   :       1965   MRN:      [id number]     Date of service:   2023     Chief  Complaint:  Headache, uncontrolled HTN     History Of Presenting Illness:       Ms. Nav Kinney is a 56 y.o. female who is being admitted for Malignant hypertension. Ms. Nav Kinney presented to our Emergency Department today complaining of  a generalized headaches. She says she has a hx of HTN, DM II but has not been on medications for 2-3 years. She has been caring for her sister and mother and as such a little stretched and thus exacerbating her symptoms. Headaches not associated with changes to her vision or focal weakness. She however noted that her BPs was persistent elevated. She also had some chest pain, radiating to her left shoulder and arm. In the ED, her sBPs was > 220. She received IV Labetalol and this dropped to sBP >190. She feels a little better. CT scan head was neg for acute changes and a CXR was also clear. She will be admitted for further management. Allergies   Allergen Reactions    Latex Other (comments)     peeling       Prior to Admission medications    Medication Sig Start Date End Date Taking? Authorizing Provider   amLODIPine (Norvasc) 10 mg tablet Take 1 Tablet by mouth daily for 30 days. 2/26/23 3/28/23 Yes Syeda Lovett NP   metFORMIN ER (GLUCOPHAGE XR) 500 mg tablet Take 1 Tablet by mouth daily (with dinner). Patient not taking: Reported on 2022   Sherita Byrne MD   fluticasone propionate (FLONASE) 50 mcg/actuation nasal spray 2 Sprays by Both Nostrils route daily.   Patient not taking: Reported on 2022    Salbador Hernandez MD   albuterol (PROVENTIL HFA, VENTOLIN HFA, PROAIR HFA) 90 mcg/actuation inhaler Take  by inhalation. Patient not taking: Reported on 8/9/2022    Salbador Hernandez MD   ibuprofen (MOTRIN) 800 mg tablet Take 1 Tab by mouth every eight (8) hours as needed for Pain. Patient not taking: Reported on 8/9/2022 9/29/18   Arnold Vazquez MD   diazePAM (VALIUM) 5 mg tablet Take 1 Tab by mouth every eight (8) hours as needed (spasm). Max Daily Amount: 15 mg. Patient not taking: Reported on 8/9/2022 9/29/18   Arnold Vazquez MD       Past Medical History:   Diagnosis Date    Asthma     Bronchitis     Diabetes ([facility] [address]     Type II, no insulin    Hypertension     No medication taken per patient        Past Surgical History:   Procedure Laterality Date    HX GYN  1984 and 1992    C-sections    HX GYN      Uterine ablation       Social History     Tobacco Use    Smoking status: Never    Smokeless tobacco: Never   Substance Use Topics    Alcohol use: No        Family History   Problem Relation Age of Onset    Hypertension Mother     Hypertension Father       Review of Systems:    Constitutional ROS: no fever, chills, rigors or night sweats  Respiratory ROS: no cough, sputum, hemoptysis or pleuritic pain. Cardiovascular ROS: no chest pain, palpitations, orthopnea, PND or syncope  Endocrine ROS: no polydispsia, polyuria, heat or cold intolerance or major weight change.   Gastrointestinal ROS: no dysphagia, abdominal pain, nausea, vomiting or diarrhea    Genito-Urinary ROS: no dysuria, frequency, hematuria, retention or flank pain  Musculoskeletal ROS: left knee joint pain but no swelling   Neurological ROS: no confusion, focal weakness or any other neurological symptoms  Psychiatric ROS: no depression, anxiety, mood swings  Dermatological ROS: no rash, pruritis, or urticaria  Heme-Lymph ROS: no swollen glands, bleeding    Examination:    Constitutional:  Visit Vitals  BP (!) 190/83   Pulse 80   Temp 98.6 °F (37 °C)   Resp 19   Ht 5' 3\" (1.6 m)   Wt 110.2 kg (243 lb)   LMP 12/07/2016 (Approximate)   SpO2 96%   BMI 43.05 kg/m²         General:  Weak and ill looking patient in no acute distress  Eyes: Pink conjunctivae, PERRLA with no discharge. Normal eye movements  Ear, Nose, Mouth & Throat: No ottorrhea, rhinorrhea, non tender sinuses, dry mucous membranes  Respiratory:  No accessory muscle use, clear breath sounds without crackles or wheezes  Cardiovascular:  No JVD or murmurs, regular and normal S1, S2 without thrills, bruits or peripheral edema. GI & :  Soft abdomen, obese, non-tender, non-distended, normoactive bowel sounds with no palpable organomegaly  Heme:  No cervical or axillary adenopathy. Musculoskeletal:  No cyanosis, clubbing, atrophy or deformities  Skin:  No rashes, bruising or ulcers   Neurological: Awake and alert, speech is clear, CNs 2-12 are grossly intact and otherwise non focal  Psychiatric:  Has a good insight and is oriented x 3  ________________________________________________________________________    Data Review: I have reviewed reports and independently interpreted the following  diagnostic tests    Labs:    Recent Labs     02/26/23  1602   WBC 7.2   HGB 14.8   HCT 46.7        Recent Labs     02/26/23  1602      K 3.7      CO2 30   *   BUN 12   CREA 0.88   CA 9.2   ALB 3.5   ALT 32     No components found for: GLPOC  No results for input(s): PH, PCO2, PO2, HCO3, FIO2 in the last 72 hours. No results for input(s): INR, INREXT in the last 72 hours. Imaging Studies:      Chest X-ray and CT scan head - reviewed    Personally reviewed 12 lead EKG: new LBBB block since 2013. ?ST elevation on anterior chest leads      I have also reviewed available old medical records.      Assessment & Impression:     Ms. Rachell Brannon is a 56 y.o. female being evaluated for:     Principal Problem:    Malignant hypertension (2/26/2023)    Active Problems:    DM (diabetes mellitus), type 2 with neurological complications ([facility] [address] (6/91/8950)      Asthma, mild intermittent (2/26/2023)      Osteoarthritis of left knee (2/26/2023)      ALVAREZ (obstructive sleep apnea) (2/26/2023)      Bilateral headaches (2/26/2023)         Plan of management:    Malignant hypertension (2/26/2023) / Bilateral headaches (2/26/2023) POA: has been off medications x 2-3 years. Now with symptoms. Admit to hospital. Start Amlodipine and Lisinopril. IV Hydralazine PRN. Get an Echocardiogram. Monitor    DM (diabetes mellitus), type 2 with neurological complications ([facility] [address] (9/24/4133) POA: check an A1c. Start Lantus, SSi per protocol. Add Metformin in AM. DM diet    LBBB (left bundle branch block) (2/26/2023) POA: new. Troponin neg. Given her chest discomfort, I have asked the ED team to reach out to cardiology to review and placed a consult    Asthma, mild intermittent (2/26/2023) POA: not wheezing. Duo Neb PRN. Oxygen PRN    Osteoarthritis of left knee (2/26/2023) POA: Tylenol PRN for now. ALVAREZ (obstructive sleep apnea) (2/26/2023) POA: she will need outpatient sleep study. 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       Level of care:     Risk of deterioration: high      Total time spent for the care of the patient: 76 [address] discussed with: Patient, Family, Nursing Staff and ED physician    Prophylaxis:  Lovenox    Probable Disposition:  Home w/Family    PCP:      None          ___________________________________________________    Attending Physician: Alaina Saab MD

## 2023-02-26 NOTE — ED TRIAGE NOTES
Patient reports she woke up this morning when a headache so she decided to check her BP and reports it was high so she decided to come to the ED    Patient reports she has a history of HTN but hasn't been on medications for the last 2-3 years.

## 2023-02-26 NOTE — ED NOTES
Pt reporting CP is decreasing but pain in L side of neck and shoulder blade; provider notified; repeat EKG performed

## 2023-02-26 NOTE — DISCHARGE INSTRUCTIONS
Hospital Medicine DISCHARGE INSTRUCTIONS    NAME: Cathy Bianchi   :  1965   MRN:  [id number]     Date:     2023    ADMIT DATE: 2023     DISCHARGE DATE: 2023     PRINCIPAL ADMITTING DIAGNOSIS:  Malignant hypertension [I10]    DISCHARGE DIAGNOSES:  Principal Problem:    Malignant hypertension (2023)    DM (diabetes mellitus), type 2 with neurological complications (HCC) ()    Asthma, mild intermittent (2023)    Osteoarthritis of left knee (2023)    ALVAREZ (obstructive sleep apnea) (2023)    Bilateral headaches (2023)    LBBB (left bundle branch block) (2023)    MEDICATIONS:    It is important that medications are taken exactly as they are prescribed on the discharge medication instructions and keep them your  in the bottles provided by the pharmacist.   Keep a list of the medication names, dosages, and times to be taken at all times. Do not take other medications without consulting your doctor. Recommended diet:  Diabetic Diet    Recommended activity: Activity as tolerated    Post discharge care:    Notify follow up health care provider or return to the emergency department if you cannot get hold of your doctor if you feel worse or experience symptoms similar to those that brought you to hospital    [address] [facility]  [address] [id number]  [phone]  Schedule an appointment as soon as possible for a visit in 3 days  for re evaluation of blood pressure. [facility] EMERGENCY [facility]PT  30 [facility]  [phone]    If symptoms worsen    Jayla Gautam [address] [place] [address]  5044 [facility]  [phone]    Follow up on 3/14/2023  9:40 am       Information obtained by :  I understand that if any problems occur once I am at home I am to contact my physician and I understand and acknowledge receipt of the instructions indicated above. Physician's or R.N.'s Signature                                                                  Date/Time                                                                                                                                              Patient or Representative Signature                                                          Date/Time

## 2023-02-26 NOTE — Clinical Note
[address]  [facility] EMERGENCY DEPT  [address] [id number]  [phone]    Work/School Note    Date: 2/26/2023    To Whom It May concern:    Madelaine Cao was seen and treated today in the emergency room by the following provider(s):  Attending Provider: Felix Mcgowan MD  Nurse Practitioner: Kaur Jara NP. Madelaine Cao is excused from work/school on 02/26/23 and 02/27/23. She is medically clear to return to work/school on 2/28/2023.        Sincerely,          Aly Devi NP

## 2023-02-26 NOTE — Clinical Note
Status[de-identified] INPATIENT [101]   Type of Bed: Stepdown [17]   Cardiac Monitoring Required?: Yes   Inpatient Hospitalization Certified Necessary for the Following Reasons: 3.  Patient receiving treatment that can only be provided in an inpatient setting (further clarification in H&P documentation)   Admitting Diagnosis: Malignant hypertension [201647]   Admitting Physician: Harjinder Harrington   Attending Physician: José De Souza [[id number]]   Estimated Length of Stay: 2 Midnights   Discharge Plan[de-identified] Home with Office Follow-up

## 2023-02-26 NOTE — ED PROVIDER NOTES
This is 56year old female who presents to the emergency room with complaints of pain in the middle of her head. Patient also states she took her blood pressure and it was high so she decided to come to the emergency room for evaluation. States she has been under tremendous amount of stress taking care of her mother and her sister and has not had enough sleep. Was on blood pressure medication in the past but has not been on it for the last 2 to 3 years. Denies any chest pain, shortness of breath, dizziness, nausea or vomiting, fevers or chills. No speech difficulty. No unilateral weakness. There are no further complaints at this time. Past Medical History:   Diagnosis Date    Asthma     Bronchitis     Diabetes ([facility] [address]     Type II, no insulin    Hypertension     No medication taken per patient       Past Surgical History:   Procedure Laterality Date    HX GYN  1984 and 1992    C-sections    HX GYN      Uterine ablation         No family history on file. Social History     Socioeconomic History    Marital status: SINGLE     Spouse name: Not on file    Number of children: Not on file    Years of education: Not on file    Highest education level: Not on file   Occupational History    Not on file   Tobacco Use    Smoking status: Never    Smokeless tobacco: Never   Substance and Sexual Activity    Alcohol use: No    Drug use: No    Sexual activity: Not on file   Other Topics Concern    Not on file   Social History Narrative    Not on file     Social Determinants of Health     Financial Resource Strain: Not on file   Food Insecurity: Not on file   Transportation Needs: Not on file   Physical Activity: Not on file   Stress: Not on file   Social Connections: Not on file   Intimate Partner Violence: Not on file   Housing Stability: Not on file         ALLERGIES: Latex    Review of Systems   Constitutional:  Negative for appetite change, chills, diaphoresis, fatigue and fever.    HENT:  Negative for congestion, ear discharge, ear pain, sinus pressure, sinus pain, sore throat and trouble swallowing. Eyes:  Negative for photophobia, pain, redness and visual disturbance. Respiratory:  Negative for chest tightness, shortness of breath and wheezing. Cardiovascular:  Negative for chest pain and palpitations. Gastrointestinal:  Negative for abdominal distention, abdominal pain, nausea and vomiting. Endocrine: Negative. Genitourinary:  Negative for difficulty urinating, flank pain, frequency and urgency. Musculoskeletal:  Negative for back pain, neck pain and neck stiffness. Skin:  Negative for color change, pallor, rash and wound. Allergic/Immunologic: Negative. Neurological:  Positive for headaches. Negative for dizziness, speech difficulty and weakness. Hematological:  Does not bruise/bleed easily. Psychiatric/Behavioral:  Negative for behavioral problems. The patient is not nervous/anxious. There were no vitals filed for this visit. Physical Exam  Vitals and nursing note reviewed. Constitutional:       General: She is not in acute distress. Appearance: Normal appearance. She is well-developed. She is not ill-appearing. HENT:      Head: Normocephalic and atraumatic. Right Ear: External ear normal.      Left Ear: External ear normal.      Nose: Nose normal. No congestion. Mouth/Throat:      Mouth: Mucous membranes are moist.   Eyes:      General:         Right eye: No discharge. Left eye: No discharge. Conjunctiva/sclera: Conjunctivae normal.      Pupils: Pupils are equal, round, and reactive to light. Neck:      Vascular: No JVD. Trachea: No tracheal deviation. Cardiovascular:      Rate and Rhythm: Normal rate and regular rhythm. Pulses: Normal pulses. Heart sounds: Normal heart sounds. No murmur heard. No gallop. Pulmonary:      Effort: Pulmonary effort is normal. No respiratory distress. Breath sounds: Normal breath sounds. Chest:      Chest wall: No tenderness. Abdominal:      General: Bowel sounds are normal. There is no distension. Palpations: Abdomen is soft. Tenderness: There is no abdominal tenderness. There is no guarding or rebound. Genitourinary:     Comments: Negative    Musculoskeletal:         General: No tenderness. Normal range of motion. Cervical back: Normal range of motion and neck supple. No tenderness. Skin:     General: Skin is warm and dry. Capillary Refill: Capillary refill takes less than 2 seconds. Coloration: Skin is not pale. Findings: No erythema or rash. Neurological:      General: No focal deficit present. Mental Status: She is alert and oriented to person, place, and time. Motor: No weakness. Coordination: Coordination normal.      Comments: No neurological deficits. Psychiatric:         Mood and Affect: Mood normal.         Behavior: Behavior normal.         Thought Content: Thought content normal.         Judgment: Judgment normal.        Medical Decision Making  This is a 68-year-old female with complaints of a headache and hypertension. States she took her blood pressure at home multiple times and it was continually high reading 200s over 120s. States her headache is in the middle of her head. Does not have any unilateral weakness, speech difficulties. No visual changes. No ataxia. Concern for stroke decreased due to no neurological deficits but will check CT head due to new onset headache. During hospital stay patient complaining of chest pain and was found to have significant ekg changes when compared to prior ekg from 2013. Labs and troponin added. Differential diagnosis also includes migraine, primary hypertension, MI and others. 1. Previous notes (external to Emergency room) were reviewed: chart review    2. History was obtained by: patient    3.  Chronic medical issues affecting this visit:   Past Medical History:  No date: Asthma  No date: Bronchitis  No date: Diabetes ([facility] [address]      Comment:  Type II, no insulin  No date: Hypertension      Comment:  No medication taken per patient  Past Surgical History:  1984 and 1992: HX GYN      Comment:  C-sections  No date: HX GYN      Comment:  Uterine ablation      4. I ordered the following tests, followed by review of final reads by lab and radiology: CT head    5. I considered ordering: cbc, cmp, troponin    6. Medical intervention: Norvasc      Surgical intervention: None indicated at this time. 7. Social determinants of health: none    8 Final diagnosis: HTN, Chest pain, elevated BNP, hyperglycemia. Medications prescribed: labetolol, norvasc    9. Disposition: admit to the hospitalist service for further evaluation and treatment, cardiology for a new left BBB      Problems Addressed:  Chest pain, unspecified type: acute illness or injury  Elevated brain natriuretic peptide (BNP) level: acute illness or injury  Hyperglycemia: chronic illness or injury  Primary hypertension: acute illness or injury    Amount and/or Complexity of Data Reviewed  Labs: ordered. Decision-making details documented in ED Course. Radiology: ordered. ECG/medicine tests: ordered. Risk  Prescription drug management. Decision regarding hospitalization. ED Course as of 02/26/23 1655   Sun Feb 26, 2023   1539 EKG: Sinus rhythm, left branch block, ventricular rate of 90, , , QTc 484, significant change in EKG when compared with July 2013. [KP]   0814 C/o chest pain, EKG complete with changes noted from 2013. [KR]   1631 HCT: 46.7 [KR]      ED Course User Index  [KP] Jef Hunt DO  [KR] Kaur Jara NP     Perfect Serve Consult for Admission  4:55 PM    ED Room Number: ER08/08  Patient Name and age:  Madelaine Cao 56 y.o.  female  Working Diagnosis:   1. Primary hypertension    2. Chest pain, unspecified type    3. Elevated brain natriuretic peptide (BNP) level    4. Hyperglycemia        COVID-19 Suspicion:  no  Sepsis present:  no  Reassessment needed: no  Code Status:  Full Code  Readmission: no  Isolation Requirements:  no  Recommended Level of Care:  telemetry  Department: [facility] - [phone]    Other:  56year old female presents with a headache and HTN initially followed by chest pain. Labetalol given in the ED. No neurological deficits but states chest pain continues. Significant ekg changes from last ekg in 2013.      Procedures

## 2023-02-26 NOTE — PROGRESS NOTES
Enoxaparin 40 mg SQ Q24H adjusted to 30 mg SQ Q12H (weight 101-150 kg, CrCl > 30 mL/min) per protocol    Thank you,  David STEVENSON [facility]

## 2023-02-27 ENCOUNTER — APPOINTMENT (OUTPATIENT)
Dept: NUCLEAR MEDICINE | Age: 58
DRG: 199 | End: 2023-02-27
Attending: INTERNAL MEDICINE
Payer: MEDICAID

## 2023-02-27 ENCOUNTER — APPOINTMENT (OUTPATIENT)
Dept: NON INVASIVE DIAGNOSTICS | Age: 58
DRG: 199 | End: 2023-02-27
Attending: INTERNAL MEDICINE
Payer: MEDICAID

## 2023-02-27 LAB
ATRIAL RATE: 90 BPM
ATRIAL RATE: 91 BPM
CALCULATED P AXIS, ECG09: 54 DEGREES
CALCULATED P AXIS, ECG09: 57 DEGREES
CALCULATED R AXIS, ECG10: 33 DEGREES
CALCULATED R AXIS, ECG10: 56 DEGREES
CALCULATED T AXIS, ECG11: -111 DEGREES
CALCULATED T AXIS, ECG11: -135 DEGREES
DIAGNOSIS, 93000: NORMAL
DIAGNOSIS, 93000: NORMAL
EST. AVERAGE GLUCOSE BLD GHB EST-MCNC: 309 MG/DL
GLUCOSE BLD STRIP.AUTO-MCNC: 273 MG/DL (ref 65–117)
GLUCOSE BLD STRIP.AUTO-MCNC: 274 MG/DL (ref 65–117)
GLUCOSE BLD STRIP.AUTO-MCNC: 283 MG/DL (ref 65–117)
GLUCOSE BLD STRIP.AUTO-MCNC: 318 MG/DL (ref 65–117)
GLUCOSE BLD STRIP.AUTO-MCNC: 326 MG/DL (ref 65–117)
HBA1C MFR BLD: 12.4 % (ref 4–5.6)
NUC REST EJECTION FRACTION: 53 %
P-R INTERVAL, ECG05: 164 MS
P-R INTERVAL, ECG05: 178 MS
Q-T INTERVAL, ECG07: 388 MS
Q-T INTERVAL, ECG07: 396 MS
QRS DURATION, ECG06: 140 MS
QRS DURATION, ECG06: 140 MS
QTC CALCULATION (BEZET), ECG08: 477 MS
QTC CALCULATION (BEZET), ECG08: 484 MS
SERVICE CMNT-IMP: ABNORMAL
STRESS BASELINE DIAS BP: 57 MMHG
STRESS BASELINE HR: 95 BPM
STRESS BASELINE SYS BP: 127 MMHG
STRESS ESTIMATED WORKLOAD: 1 METS
STRESS EXERCISE DUR MIN: 3 MIN
STRESS EXERCISE DUR SEC: 0 SEC
STRESS PEAK DIAS BP: 57 MMHG
STRESS PEAK SYS BP: 127 MMHG
STRESS PERCENT HR ACHIEVED: 62 %
STRESS POST PEAK HR: 102 BPM
STRESS RATE PRESSURE PRODUCT: NORMAL BPM*MMHG
STRESS TARGET HR: 165 BPM
VENTRICULAR RATE, ECG03: 90 BPM
VENTRICULAR RATE, ECG03: 91 BPM

## 2023-02-27 PROCEDURE — 74011000250 HC RX REV CODE- 250: Performed by: INTERNAL MEDICINE

## 2023-02-27 PROCEDURE — 74011250637 HC RX REV CODE- 250/637: Performed by: INTERNAL MEDICINE

## 2023-02-27 PROCEDURE — 74011250636 HC RX REV CODE- 250/636: Performed by: INTERNAL MEDICINE

## 2023-02-27 PROCEDURE — 74011250637 HC RX REV CODE- 250/637: Performed by: NURSE PRACTITIONER

## 2023-02-27 PROCEDURE — 74011636637 HC RX REV CODE- 636/637: Performed by: INTERNAL MEDICINE

## 2023-02-27 PROCEDURE — 99222 1ST HOSP IP/OBS MODERATE 55: CPT | Performed by: INTERNAL MEDICINE

## 2023-02-27 PROCEDURE — 94761 N-INVAS EAR/PLS OXIMETRY MLT: CPT

## 2023-02-27 PROCEDURE — 65270000029 HC RM PRIVATE

## 2023-02-27 PROCEDURE — A9500 TC99M SESTAMIBI: HCPCS

## 2023-02-27 PROCEDURE — 82962 GLUCOSE BLOOD TEST: CPT

## 2023-02-27 PROCEDURE — 77010033678 HC OXYGEN DAILY

## 2023-02-27 PROCEDURE — 93017 CV STRESS TEST TRACING ONLY: CPT

## 2023-02-27 PROCEDURE — 77030038269 HC DRN EXT URIN PURWCK BARD -A

## 2023-02-27 PROCEDURE — 78452 HT MUSCLE IMAGE SPECT MULT: CPT | Performed by: INTERNAL MEDICINE

## 2023-02-27 RX ORDER — INSULIN GLARGINE 100 [IU]/ML
0.3 INJECTION, SOLUTION SUBCUTANEOUS
Status: DISCONTINUED | OUTPATIENT
Start: 2023-02-27 | End: 2023-02-28 | Stop reason: HOSPADM

## 2023-02-27 RX ORDER — LISINOPRIL 20 MG/1
40 TABLET ORAL DAILY
Status: DISCONTINUED | OUTPATIENT
Start: 2023-02-27 | End: 2023-02-28 | Stop reason: HOSPADM

## 2023-02-27 RX ORDER — HYDROCHLOROTHIAZIDE 25 MG/1
25 TABLET ORAL DAILY
Status: DISCONTINUED | OUTPATIENT
Start: 2023-02-27 | End: 2023-02-28

## 2023-02-27 RX ORDER — TRIPROLIDINE/PSEUDOEPHEDRINE 2.5MG-60MG
200 TABLET ORAL ONCE
Status: COMPLETED | OUTPATIENT
Start: 2023-02-27 | End: 2023-02-27

## 2023-02-27 RX ORDER — METFORMIN HYDROCHLORIDE 500 MG/1
500 TABLET ORAL 2 TIMES DAILY WITH MEALS
Status: DISCONTINUED | OUTPATIENT
Start: 2023-02-27 | End: 2023-02-28 | Stop reason: HOSPADM

## 2023-02-27 RX ORDER — IBUPROFEN 600 MG/1
600 TABLET ORAL ONCE
Status: COMPLETED | OUTPATIENT
Start: 2023-02-27 | End: 2023-02-27

## 2023-02-27 RX ORDER — NYSTATIN 100000 [USP'U]/G
POWDER TOPICAL 2 TIMES DAILY
Status: DISCONTINUED | OUTPATIENT
Start: 2023-02-27 | End: 2023-02-28 | Stop reason: HOSPADM

## 2023-02-27 RX ORDER — TETRAKIS(2-METHOXYISOBUTYLISOCYANIDE)COPPER(I) TETRAFLUOROBORATE 1 MG/ML
27 INJECTION, POWDER, LYOPHILIZED, FOR SOLUTION INTRAVENOUS
Status: COMPLETED | OUTPATIENT
Start: 2023-02-27 | End: 2023-02-27

## 2023-02-27 RX ORDER — TETRAKIS(2-METHOXYISOBUTYLISOCYANIDE)COPPER(I) TETRAFLUOROBORATE 1 MG/ML
10 INJECTION, POWDER, LYOPHILIZED, FOR SOLUTION INTRAVENOUS
Status: COMPLETED | OUTPATIENT
Start: 2023-02-27 | End: 2023-02-27

## 2023-02-27 RX ADMIN — HYDRALAZINE HYDROCHLORIDE 20 MG: 20 INJECTION INTRAMUSCULAR; INTRAVENOUS at 15:09

## 2023-02-27 RX ADMIN — HYDRALAZINE HYDROCHLORIDE 20 MG: 20 INJECTION INTRAMUSCULAR; INTRAVENOUS at 21:46

## 2023-02-27 RX ADMIN — NYSTATIN: 100000 POWDER TOPICAL at 10:04

## 2023-02-27 RX ADMIN — ONDANSETRON 4 MG: 2 INJECTION INTRAMUSCULAR; INTRAVENOUS at 16:40

## 2023-02-27 RX ADMIN — METFORMIN HYDROCHLORIDE 500 MG: 500 TABLET ORAL at 16:41

## 2023-02-27 RX ADMIN — INSULIN GLARGINE 33 UNITS: 100 INJECTION, SOLUTION SUBCUTANEOUS at 21:45

## 2023-02-27 RX ADMIN — TETRAKIS(2-METHOXYISOBUTYLISOCYANIDE)COPPER(I) TETRAFLUOROBORATE 10 MILLICURIE: 1 INJECTION, POWDER, LYOPHILIZED, FOR SOLUTION INTRAVENOUS at 10:06

## 2023-02-27 RX ADMIN — IBUPROFEN 600 MG: 600 TABLET, FILM COATED ORAL at 21:45

## 2023-02-27 RX ADMIN — LISINOPRIL 40 MG: 20 TABLET ORAL at 08:17

## 2023-02-27 RX ADMIN — IPRATROPIUM BROMIDE AND ALBUTEROL SULFATE 3 ML: .5; 3 SOLUTION RESPIRATORY (INHALATION) at 10:10

## 2023-02-27 RX ADMIN — AMLODIPINE BESYLATE 10 MG: 5 TABLET ORAL at 08:17

## 2023-02-27 RX ADMIN — INSULIN LISPRO 7 UNITS: 100 INJECTION, SOLUTION INTRAVENOUS; SUBCUTANEOUS at 16:41

## 2023-02-27 RX ADMIN — METFORMIN HYDROCHLORIDE 500 MG: 500 TABLET ORAL at 08:17

## 2023-02-27 RX ADMIN — INSULIN LISPRO 10 UNITS: 100 INJECTION, SOLUTION INTRAVENOUS; SUBCUTANEOUS at 08:18

## 2023-02-27 RX ADMIN — INSULIN LISPRO 4 UNITS: 100 INJECTION, SOLUTION INTRAVENOUS; SUBCUTANEOUS at 21:46

## 2023-02-27 RX ADMIN — REGADENOSON 0.4 MG: 0.08 INJECTION, SOLUTION INTRAVENOUS at 11:47

## 2023-02-27 RX ADMIN — Medication 10 ML: at 21:48

## 2023-02-27 RX ADMIN — HYDROCHLOROTHIAZIDE 25 MG: 25 TABLET ORAL at 08:17

## 2023-02-27 RX ADMIN — HYDRALAZINE HYDROCHLORIDE 20 MG: 20 INJECTION INTRAMUSCULAR; INTRAVENOUS at 01:49

## 2023-02-27 RX ADMIN — INSULIN LISPRO 10 UNITS: 100 INJECTION, SOLUTION INTRAVENOUS; SUBCUTANEOUS at 13:14

## 2023-02-27 RX ADMIN — IPRATROPIUM BROMIDE AND ALBUTEROL SULFATE 3 ML: .5; 3 SOLUTION RESPIRATORY (INHALATION) at 16:41

## 2023-02-27 RX ADMIN — TETRAKIS(2-METHOXYISOBUTYLISOCYANIDE)COPPER(I) TETRAFLUOROBORATE 27 MILLICURIE: 1 INJECTION, POWDER, LYOPHILIZED, FOR SOLUTION INTRAVENOUS at 11:53

## 2023-02-27 RX ADMIN — IBUPROFEN 200 MG: 100 SUSPENSION ORAL at 10:03

## 2023-02-27 NOTE — PROGRESS NOTES
Pawan Ansari [facility] [address]  [address] [facility], [address] [place] [address]  [phone]      Hospitalist  Progress Note      NAME:         Karon Lopez   :        1965  MRM:        [id number]    Date of service: 2023      Subjective: Patient seen and examined by me. Admitted with uncontrolled HTN. She says she still has a headache. No chest pain or SOB. No cough or fever. Mild wheeze.       Objective:    Vital Signs:    Visit Vitals  BP (!) 158/57   Pulse 80   Temp 98.3 °F (36.8 °C)   Resp 18   Ht 5' 3\" (1.6 m)   Wt 110.2 kg (243 lb)   LMP 2016 (Approximate)   SpO2 94%   BMI 43.05 kg/m²      No intake or output data in the 24 hours ending 23 0839     Current inpatient medications reviewed:  Current Facility-Administered Medications   Medication Dose Route Frequency    lisinopriL (PRINIVIL, ZESTRIL) tablet 40 mg  40 mg Oral DAILY    hydroCHLOROthiazide (HYDRODIURIL) tablet 25 mg  25 mg Oral DAILY    metFORMIN (GLUCOPHAGE) tablet 500 mg  500 mg Oral BID WITH MEALS    insulin glargine (LANTUS) injection 33 Units  0.3 Units/kg SubCUTAneous QHS    ibuprofen (MOTRIN) tablet 200 mg  200 mg Oral ONCE    nystatin (MYCOSTATIN) 100,000 unit/gram powder   Topical BID    sodium chloride (NS) flush 5-40 mL  5-40 mL IntraVENous Q8H    sodium chloride (NS) flush 5-40 mL  5-40 mL IntraVENous PRN    acetaminophen (TYLENOL) tablet 650 mg  650 mg Oral Q6H PRN    Or    acetaminophen (TYLENOL) suppository 650 mg  650 mg Rectal Q6H PRN    polyethylene glycol (MIRALAX) packet 17 g  17 g Oral DAILY PRN    senna (SENOKOT) tablet 8.6 mg  1 Tablet Oral DAILY PRN    promethazine (PHENERGAN) tablet 12.5 mg  12.5 mg Oral Q6H PRN    Or    ondansetron (ZOFRAN) injection 4 mg  4 mg IntraVENous Q6H PRN    insulin lispro (HUMALOG) injection   SubCUTAneous AC&HS    glucose chewable tablet 16 g  4 Tablet Oral PRN    glucagon (GLUCAGEN) injection 1 mg  1 mg IntraMUSCular PRN    dextrose 10% infusion 0-250 mL  0-250 mL IntraVENous PRN    enoxaparin (LOVENOX) injection 30 mg  30 mg SubCUTAneous Q12H    amLODIPine (NORVASC) tablet 10 mg  10 mg Oral DAILY    hydrALAZINE (APRESOLINE) 20 mg/mL injection 20 mg  20 mg IntraVENous Q6H PRN    albuterol-ipratropium (DUO-NEB) 2.5 MG-0.5 MG/3 ML  3 mL Nebulization Q6H PRN     Current Outpatient Medications   Medication Sig    amLODIPine (Norvasc) 10 mg tablet Take 1 Tablet by mouth daily for 30 days. metFORMIN ER (GLUCOPHAGE XR) 500 mg tablet Take 1 Tablet by mouth daily (with dinner). (Patient not taking: Reported on 8/9/2022)    fluticasone propionate (FLONASE) 50 mcg/actuation nasal spray 2 Sprays by Both Nostrils route daily. (Patient not taking: Reported on 8/9/2022)    albuterol (PROVENTIL HFA, VENTOLIN HFA, PROAIR HFA) 90 mcg/actuation inhaler Take  by inhalation. (Patient not taking: Reported on 8/9/2022)    ibuprofen (MOTRIN) 800 mg tablet Take 1 Tab by mouth every eight (8) hours as needed for Pain. (Patient not taking: Reported on 8/9/2022)    diazePAM (VALIUM) 5 mg tablet Take 1 Tab by mouth every eight (8) hours as needed (spasm). Max Daily Amount: 15 mg. (Patient not taking: Reported on 8/9/2022)       Physical Examination:    General:   Weak and ill looking but not in any acute distress   Eyes:   pink conjunctivae, PERRLA with no discharge. ENT:   no ottorrhea or rhinorrhea with dry mucous membranes  Neck: no masses, thyroid non-tender and trachea central.  Pulm:  no accessory muscle use, decreased but clear breath sounds without crackles. + wheezes  Card:  no JVD or murmurs, has regular and normal S1, S2 without thrills, bruits. + peripheral edema  Abd:  Soft, non-tender, non-distended, normoactive bowel sounds with no palpable organomegaly  Musc:  No cyanosis, clubbing, atrophy or deformities. Skin:  No rashes, bruising or ulcers. Neuro: Awake and alert.  Generally a non focal exam. Follows commands appropriately  Psych:  Has a good insight and is oriented x 3    Diagnostic testing:    Laboratory data reviewed and independently interpreted:    Recent Labs     02/26/23  1602   WBC 7.2   HGB 14.8   HCT 46.7        Recent Labs     02/26/23  1602      K 3.7      CO2 30   *   BUN 12   CREA 0.88   CA 9.2   ALB 3.5   ALT 32     No components found for: [place]    Imaging studies reviewed and reports noted: CXR, CT scan head    Telemetry reviewed and independently interpreted by me: LBBB pattern     Assessment and Plan:    Malignant hypertension (2/26/2023) / Bilateral headaches (2/26/2023) POA: has been off medications x 2-3 years. Admitted with headaches and chest discomfort. Continue Amlodipine and increase Lisinopril dose. Start HCTZ. Follow pending Echocardiogram.      DM (diabetes mellitus), type 2 with neurological complications ([facility] [address] (2/96/2064) POA: uncontrolled. Was not taking any medications. A1c pending. Start Metformin. Continue Lantus, SSi per protocol. DM diet     LBBB (left bundle branch block) (2/26/2023) POA: new. Troponin neg. Given her chest discomfort, awaiting a cardiology review. Echo pending. Asthma, mild intermittent (2/26/2023) POA: not wheezing. Continue Duo Neb PRN. Oxygen PRN. Will need Albuterol inhaler at discharge     Osteoarthritis of left knee (2/26/2023) POA: Tylenol PRN for now. Ambulate as tolerated     ALVAREZ (obstructive sleep apnea) (2/26/2023) POA: she will need outpatient sleep study. Outpatient follow up with PCP    Care Plan discussed with: Patient, nurse and CM     Prophylaxis:  Lovenox    Diet:  Diabetic Diet    Patient status: Inpatient     Level of care: Telemetry           Anticipated Disposition:  Home w/Family    PCP:      None     I have personally examined and treated the patient at bedside during this period.  To assist coordination of care and communication with nursing and staff, this note may be preliminary early in the day, but finalized by end of the day.         ___________________________________________________    Attending Physician:   Susan Best MD

## 2023-02-27 NOTE — PROGRESS NOTES
2/27/2023  10:21 AM  Case Management note    Reason for Admission:  malignant hypertension    Patient came to hospital for headache and chest pain. Patient is independent with ADL's, drives and caregiver for mother and sister. Patient has history of HTN and DM. She has not been taking medication for HTN. [address] [facility] [address]  [facility]  Patient got new medical card with MD name and needs to make appointment. . she does not recall name. RUR Score:          5%           Plan for utilizing home health:      patient is independent and will most likely not qualify for home health services    PCP: First and Last name:       Name of Practice:    Are you a current patient: Yes/No:    Approximate date of last visit:    Can you participate in a virtual visit with your PCP:                     Current Advanced Directive/Advance Care Plan: Full Code AD      Healthcare Decision Maker:   Thalia Grimm daughter [phone] [facility] daughter                              Transition of Care Plan:                 Home with family assistance  PCP follow up  AD planning  CM to follow for discharge needs    Care Management Interventions  PCP Verified by CM:  Yes (new MD who she has not seen yet)  Support Systems: Child(jessa)  Confirm Follow Up Transport: Family  The Plan for Transition of Care is Related to the Following Treatment Goals : malagiant hypertension  Discharge Location  Patient Expects to be Discharged to[de-identified] Home with family assistance  Esther Holman

## 2023-02-27 NOTE — CONSULTS
[facility]                    Cardiology Care Note     [x]Initial Encounter     []Follow-up    Patient Name: Lilian Ackerman - :1965 - DKJ:[id number]  Primary Cardiologist: [address] [place] [address] Cardiology Physicians:    Consulting Cardiologist: [address] [place] [address] Cardiology Physicians: Olivia Hein MD     Reason for encounter: CP, HTN, LBBB    HPI:       Lilian Ackerman is a 56 y.o. female with PMH significant for HTN, HLD, DM2 admitted with CP, hypertensive urgency. Reports being very busy at home, caring for an elderly aunt and mother. Has a history of hypertension, diabetes. Has not seen a doctor since pre-COVID. Presented with escalating chest pain found to have a hypertensive urgency. She has significant glucosuria, her hemoglobin A1c is greater than 12. She has been initiated on an antihypertensive regimen, as well as metformin and insulin. Her EKG shows sinus rhythm with left bundle branch block. This is newly appreciated. She reports a cough, no current chest pain. High-sensitivity troponin 24. . Subjective:      Tova Ashton reports  headache . Assessment and Plan       1. Chest pain, hypertensive urgency-has been off medications for over 2 years. Restart and antihypertensive regimen, lisinopril 40, amlodipine 10, hydrochlorothiazide 25-her blood pressure has decreased nicely. Given her significant risk factors of uncontrolled hypertension, uncontrolled diabetes, newly appreciated left bundle branch block she would best be served by an ischemic evaluation. I have ordered a Lexiscan nuclear stress test to be done today. Echo has been ordered. 2.  Hypertension -restarted on amlodipine 10, hydrochlorothiazide 25 lisinopril 40. Blood pressure currently 140/90, approximately. May escalate regimen from here. 3.  Uncontrolled diabetes-A1c greater than 12.   Restarted on metformin and sliding scale insulin. Will defer to primary team for diabetic regimen. 4.  Left bundle branch block-echo and stress as above. 5.  ALVAREZ -needs to reestablish with pulmonary to obtain home CPAP. She had to return hers after she was not using it.       ____________________________________________________________    Cardiac testing              Most recent HS troponins:  Recent Labs     02/26/23  1602   TROPHS 24       ECG: normal sinus rhythm, LBBB    Review of Systems:    []All other systems reviewed and all negative except as written in HPI    [] Patient unable to provide secondary to condition    Past Medical History:   Diagnosis Date    Asthma     Bronchitis     Diabetes ([address]     Type II, no insulin    Hypertension     No medication taken per patient     Past Surgical History:   Procedure Laterality Date    HX GYN  1984 and 1992    C-sections    HX GYN      Uterine ablation     Social Hx:  reports that she has never smoked. She has never used smokeless tobacco. She reports that she does not drink alcohol and does not use drugs. Family Hx: family history includes Hypertension in her father and mother. Allergies   Allergen Reactions    Latex Other (comments)     peeling          OBJECTIVE:  Wt Readings from Last 3 Encounters:   02/26/23 110.2 kg (243 lb)   08/08/22 105 kg (231 lb 7.7 oz)   09/29/18 108.9 kg (240 lb)     No intake or output data in the 24 hours ending 02/27/23 0932    Physical Exam:    Vitals:   Vitals:    02/27/23 0627 02/27/23 0657 02/27/23 0700 02/27/23 0924   BP: (!) 151/57 (!) 158/57  139/73   Pulse: 82 80 80 84   Resp: 18 18  16   Temp:    99.2 °F (37.3 °C)   SpO2:    99%   Weight:       Height:         Telemetry: normal sinus rhythm    Gen: Well-developed, well-nourished, in no acute distress  Neck: Supple, No JVD, No Carotid Bruit  Resp: No accessory muscle use, Clear breath sounds, No rales or rhonchi  Card: Regular Rate,Rythm, Normal S1, S2, No murmurs, rubs or gallop. No thrills.    Abd: Soft, non-tender, non-distended, BS+   MSK: No cyanosis  Skin: No rashes    Neuro: Moving all four extremities, follows commands appropriately  Psych: Good insight, oriented to person, place, alert, Nml Affect  LE: No edema    Data Review:     Radiology:   XR Results (most recent):  Results from Hospital Encounter encounter on 02/26/23    XR CHEST PA LAT    Narrative  INDICATION:  chest pain    Exam: Chest 2 views. Comparison: 12/6/2012. Findings: Cardiomediastinal silhouette is normal. Pulmonary vasculature is not  engorged. No focal parenchymal opacities, effusions, or pneumothorax. Bony  thorax is intact. Impression  1. No acute cardiopulmonary disease      Recent Labs     02/26/23  1602      K 3.7      CO2 30   BUN 12   CREA 0.88   *   CA 9.2     Recent Labs     02/26/23  1602   WBC 7.2   HGB 14.8   HCT 46.7        Recent Labs     02/26/23  1602   *     No results for input(s): CHOL, LDLC in the last 72 hours.     No lab exists for component: TGL, HDLC,  HBA1C      Current meds:    Current Facility-Administered Medications:     lisinopriL (PRINIVIL, ZESTRIL) tablet 40 mg, 40 mg, Oral, DAILY, Wu Farley MD, 40 mg at 02/27/23 2623    hydroCHLOROthiazide (HYDRODIURIL) tablet 25 mg, 25 mg, Oral, DAILY, Wu Farley MD, 25 mg at 02/27/23 2670    metFORMIN (GLUCOPHAGE) tablet 500 mg, 500 mg, Oral, BID WITH MEALS, Wu Farley MD, 500 mg at 02/27/23 7800    insulin glargine (LANTUS) injection 33 Units, 0.3 Units/kg, SubCUTAneous, QHS, Wu Farley MD    ibuprofen (ADVIL;MOTRIN) 100 mg/5 mL oral suspension 200 mg, 200 mg, Oral, ONCE, Wu Farley MD    nystatin (MYCOSTATIN) 100,000 unit/gram powder, , Topical, BID, Wu Farley MD    sodium chloride (NS) flush 5-40 mL, 5-40 mL, IntraVENous, Q8H, Wu Farley MD, 10 mL at 02/26/23 2200    sodium chloride (NS) flush 5-40 mL, 5-40 mL, IntraVENous, PRN, Wu Farley MD    acetaminophen (TYLENOL) tablet 650 mg, 650 mg, Oral, Q6H PRN, 650 mg at 02/26/23 2058 **OR** acetaminophen (TYLENOL) suppository 650 mg, 650 mg, Rectal, Q6H PRN, Florencio Alfred MD    polyethylene glycol (MIRALAX) packet 17 g, 17 g, Oral, DAILY PRN, Florencio Alfred MD    senna (SENOKOT) tablet 8.6 mg, 1 Tablet, Oral, DAILY PRN, Florencio Alfred MD    promethazine (PHENERGAN) tablet 12.5 mg, 12.5 mg, Oral, Q6H PRN **OR** ondansetron (ZOFRAN) injection 4 mg, 4 mg, IntraVENous, Q6H PRN, Florencio Alfred MD    insulin lispro (HUMALOG) injection, , SubCUTAneous, AC&HS, Florencio Alfred MD, 10 Units at 02/27/23 0818    glucose chewable tablet 16 g, 4 Tablet, Oral, PRN, Florencio Alfred MD    glucagon (GLUCAGEN) injection 1 mg, 1 mg, IntraMUSCular, PRN, Florencio Alfred MD    dextrose 10% infusion 0-250 mL, 0-250 mL, IntraVENous, PRN, Florencio Alfred MD    enoxaparin (LOVENOX) injection 30 mg, 30 mg, SubCUTAneous, Q12H, Florencio Alfred MD    amLODIPine (NORVASC) tablet 10 mg, 10 mg, Oral, DAILY, Florencio Alfred MD, 10 mg at 02/27/23 0817    hydrALAZINE (APRESOLINE) 20 mg/mL injection 20 mg, 20 mg, IntraVENous, Q6H PRN, Florencio Alfred MD, 20 mg at 02/27/23 0149    albuterol-ipratropium (DUO-NEB) 2.5 MG-0.5 MG/3 ML, 3 mL, Nebulization, Q6H PRN, Florencio Alfred MD    Current Outpatient Medications:     amLODIPine (Norvasc) 10 mg tablet, Take 1 Tablet by mouth daily for 30 days. , Disp: 30 Tablet, Rfl: 0    metFORMIN ER (GLUCOPHAGE XR) 500 mg tablet, Take 1 Tablet by mouth daily (with dinner). (Patient not taking: Reported on 8/9/2022), Disp: 30 Tablet, Rfl: 0    fluticasone propionate (FLONASE) 50 mcg/actuation nasal spray, 2 Sprays by Both Nostrils route daily. (Patient not taking: Reported on 8/9/2022), Disp: , Rfl:     albuterol (PROVENTIL HFA, VENTOLIN HFA, PROAIR HFA) 90 mcg/actuation inhaler, Take  by inhalation.  (Patient not taking: Reported on 8/9/2022), Disp: , Rfl:     ibuprofen (MOTRIN) 800 mg tablet, Take 1 Tab by mouth every eight (8) hours as needed for Pain. (Patient not taking: Reported on 8/9/2022), Disp: 30 Tab, Rfl: 0    diazePAM (VALIUM) 5 mg tablet, Take 1 Tab by mouth every eight (8) hours as needed (spasm). Max Daily Amount: 15 mg.  (Patient not taking: Reported on 8/9/2022), Disp: 15 Tab, Rfl: Ilir Lund MD    [facility] Cardiology  Call center: (I) [phone]  (S) [phone]      CC:None

## 2023-02-28 ENCOUNTER — APPOINTMENT (OUTPATIENT)
Dept: NON INVASIVE DIAGNOSTICS | Age: 58
DRG: 199 | End: 2023-02-28
Attending: INTERNAL MEDICINE
Payer: MEDICAID

## 2023-02-28 VITALS
HEART RATE: 80 BPM | SYSTOLIC BLOOD PRESSURE: 143 MMHG | RESPIRATION RATE: 16 BRPM | DIASTOLIC BLOOD PRESSURE: 68 MMHG | BODY MASS INDEX: 43.05 KG/M2 | HEIGHT: 63 IN | TEMPERATURE: 98 F | OXYGEN SATURATION: 98 % | WEIGHT: 243 LBS

## 2023-02-28 LAB
ALBUMIN SERPL-MCNC: 3 G/DL (ref 3.5–5)
ALBUMIN/GLOB SERPL: 0.8 (ref 1.1–2.2)
ALP SERPL-CCNC: 115 U/L (ref 45–117)
ALT SERPL-CCNC: 9 U/L (ref 12–78)
ANION GAP SERPL CALC-SCNC: 7 MMOL/L (ref 5–15)
AST SERPL-CCNC: 9 U/L (ref 15–37)
BASOPHILS # BLD: 0 K/UL (ref 0–0.1)
BASOPHILS NFR BLD: 0 % (ref 0–1)
BILIRUB SERPL-MCNC: 0.6 MG/DL (ref 0.2–1)
BUN SERPL-MCNC: 25 MG/DL (ref 6–20)
BUN/CREAT SERPL: 18 (ref 12–20)
CALCIUM SERPL-MCNC: 9 MG/DL (ref 8.5–10.1)
CHLORIDE SERPL-SCNC: 101 MMOL/L (ref 97–108)
CO2 SERPL-SCNC: 25 MMOL/L (ref 21–32)
CREAT SERPL-MCNC: 1.42 MG/DL (ref 0.55–1.02)
DIFFERENTIAL METHOD BLD: NORMAL
ECHO AO ASC DIAM: 3.3 CM
ECHO AO ASCENDING AORTA INDEX: 1.57 CM/M2
ECHO AV AREA PEAK VELOCITY: 1.9 CM2
ECHO AV AREA VTI: 1.9 CM2
ECHO AV AREA/BSA PEAK VELOCITY: 0.9 CM2/M2
ECHO AV AREA/BSA VTI: 0.9 CM2/M2
ECHO AV MEAN GRADIENT: 14 MMHG
ECHO AV MEAN VELOCITY: 1.8 M/S
ECHO AV PEAK GRADIENT: 24 MMHG
ECHO AV PEAK VELOCITY: 2.5 M/S
ECHO AV VELOCITY RATIO: 0.6
ECHO AV VTI: 43.9 CM
ECHO LA DIAMETER INDEX: 1.71 CM/M2
ECHO LA DIAMETER: 3.6 CM
ECHO LA VOL 2C: 45 ML (ref 22–52)
ECHO LA VOL 4C: 58 ML (ref 22–52)
ECHO LA VOL BP: 53 ML (ref 22–52)
ECHO LA VOL/BSA BIPLANE: 25 ML/M2 (ref 16–34)
ECHO LA VOLUME AREA LENGTH: 56 ML
ECHO LA VOLUME INDEX A2C: 21 ML/M2 (ref 16–34)
ECHO LA VOLUME INDEX A4C: 28 ML/M2 (ref 16–34)
ECHO LA VOLUME INDEX AREA LENGTH: 27 ML/M2 (ref 16–34)
ECHO LV E' LATERAL VELOCITY: 8 CM/S
ECHO LV E' SEPTAL VELOCITY: 6 CM/S
ECHO LV EDV A2C: 106 ML
ECHO LV EDV A4C: 92 ML
ECHO LV EDV BP: 99 ML (ref 56–104)
ECHO LV EDV INDEX A4C: 44 ML/M2
ECHO LV EDV INDEX BP: 47 ML/M2
ECHO LV EDV NDEX A2C: 50 ML/M2
ECHO LV EJECTION FRACTION A2C: 64 %
ECHO LV EJECTION FRACTION A4C: 58 %
ECHO LV EJECTION FRACTION BIPLANE: 60 % (ref 55–100)
ECHO LV ESV A2C: 38 ML
ECHO LV ESV A4C: 39 ML
ECHO LV ESV BP: 40 ML (ref 19–49)
ECHO LV ESV INDEX A2C: 18 ML/M2
ECHO LV ESV INDEX A4C: 19 ML/M2
ECHO LV ESV INDEX BP: 19 ML/M2
ECHO LV FRACTIONAL SHORTENING: 20 % (ref 28–44)
ECHO LV INTERNAL DIMENSION DIASTOLE INDEX: 1.67 CM/M2
ECHO LV INTERNAL DIMENSION DIASTOLIC: 3.5 CM (ref 3.9–5.3)
ECHO LV INTERNAL DIMENSION SYSTOLIC INDEX: 1.33 CM/M2
ECHO LV INTERNAL DIMENSION SYSTOLIC: 2.8 CM
ECHO LV IVSD: 1.9 CM (ref 0.6–0.9)
ECHO LV MASS 2D: 250.3 G (ref 67–162)
ECHO LV MASS INDEX 2D: 119.2 G/M2 (ref 43–95)
ECHO LV POSTERIOR WALL DIASTOLIC: 1.6 CM (ref 0.6–0.9)
ECHO LV RELATIVE WALL THICKNESS RATIO: 0.91
ECHO LVOT AREA: 3.1 CM2
ECHO LVOT AV VTI INDEX: 0.6
ECHO LVOT DIAM: 2 CM
ECHO LVOT MEAN GRADIENT: 6 MMHG
ECHO LVOT PEAK GRADIENT: 9 MMHG
ECHO LVOT PEAK VELOCITY: 1.5 M/S
ECHO LVOT STROKE VOLUME INDEX: 39.5 ML/M2
ECHO LVOT SV: 82.9 ML
ECHO LVOT VTI: 26.4 CM
ECHO MV A VELOCITY: 1.07 M/S
ECHO MV AREA VTI: 2.3 CM2
ECHO MV E DECELERATION TIME (DT): 221.7 MS
ECHO MV E VELOCITY: 0.72 M/S
ECHO MV E/A RATIO: 0.67
ECHO MV E/E' LATERAL: 9
ECHO MV E/E' RATIO (AVERAGED): 10.5
ECHO MV E/E' SEPTAL: 12
ECHO MV LVOT VTI INDEX: 1.39
ECHO MV MAX VELOCITY: 1.4 M/S
ECHO MV MEAN GRADIENT: 5 MMHG
ECHO MV MEAN VELOCITY: 1 M/S
ECHO MV PEAK GRADIENT: 8 MMHG
ECHO MV REGURGITANT PEAK GRADIENT: 100 MMHG
ECHO MV REGURGITANT PEAK VELOCITY: 5 M/S
ECHO MV VTI: 36.7 CM
ECHO PV MAX VELOCITY: 1.7 M/S
ECHO PV PEAK GRADIENT: 11 MMHG
ECHO RV INTERNAL DIMENSION: 4 CM
ECHO RV TAPSE: 2.2 CM (ref 1.7–?)
ECHO RVOT PEAK GRADIENT: 4 MMHG
ECHO RVOT PEAK VELOCITY: 1 M/S
ECHO TV REGURGITANT MAX VELOCITY: 2.06 M/S
ECHO TV REGURGITANT PEAK GRADIENT: 17 MMHG
EOSINOPHIL # BLD: 0 K/UL (ref 0–0.4)
EOSINOPHIL NFR BLD: 0 % (ref 0–7)
ERYTHROCYTE [DISTWIDTH] IN BLOOD BY AUTOMATED COUNT: 12.3 % (ref 11.5–14.5)
GLOBULIN SER CALC-MCNC: 3.9 G/DL (ref 2–4)
GLUCOSE BLD STRIP.AUTO-MCNC: 325 MG/DL (ref 65–117)
GLUCOSE BLD STRIP.AUTO-MCNC: 349 MG/DL (ref 65–117)
GLUCOSE SERPL-MCNC: 353 MG/DL (ref 65–100)
HCT VFR BLD AUTO: 41.7 % (ref 35–47)
HGB BLD-MCNC: 13.5 G/DL (ref 11.5–16)
IMM GRANULOCYTES # BLD AUTO: 0 K/UL (ref 0–0.04)
IMM GRANULOCYTES NFR BLD AUTO: 0 % (ref 0–0.5)
LYMPHOCYTES # BLD: 2.8 K/UL (ref 0.8–3.5)
LYMPHOCYTES NFR BLD: 34 % (ref 12–49)
MCH RBC QN AUTO: 27.7 PG (ref 26–34)
MCHC RBC AUTO-ENTMCNC: 32.4 G/DL (ref 30–36.5)
MCV RBC AUTO: 85.6 FL (ref 80–99)
MONOCYTES # BLD: 0.8 K/UL (ref 0–1)
MONOCYTES NFR BLD: 10 % (ref 5–13)
NEUTS SEG # BLD: 4.7 K/UL (ref 1.8–8)
NEUTS SEG NFR BLD: 56 % (ref 32–75)
NRBC # BLD: 0 K/UL (ref 0–0.01)
NRBC BLD-RTO: 0 PER 100 WBC
PLATELET # BLD AUTO: 203 K/UL (ref 150–400)
PMV BLD AUTO: 10.7 FL (ref 8.9–12.9)
POTASSIUM SERPL-SCNC: 3.6 MMOL/L (ref 3.5–5.1)
PROT SERPL-MCNC: 6.9 G/DL (ref 6.4–8.2)
RBC # BLD AUTO: 4.87 M/UL (ref 3.8–5.2)
SERVICE CMNT-IMP: ABNORMAL
SERVICE CMNT-IMP: ABNORMAL
SODIUM SERPL-SCNC: 133 MMOL/L (ref 136–145)
WBC # BLD AUTO: 8.3 K/UL (ref 3.6–11)

## 2023-02-28 PROCEDURE — 99233 SBSQ HOSP IP/OBS HIGH 50: CPT

## 2023-02-28 PROCEDURE — 82962 GLUCOSE BLOOD TEST: CPT

## 2023-02-28 PROCEDURE — 74011250636 HC RX REV CODE- 250/636: Performed by: INTERNAL MEDICINE

## 2023-02-28 PROCEDURE — 74011250637 HC RX REV CODE- 250/637: Performed by: INTERNAL MEDICINE

## 2023-02-28 PROCEDURE — 36415 COLL VENOUS BLD VENIPUNCTURE: CPT

## 2023-02-28 PROCEDURE — 74011636637 HC RX REV CODE- 636/637: Performed by: INTERNAL MEDICINE

## 2023-02-28 PROCEDURE — APPSS30 APP SPLIT SHARED TIME 16-30 MINUTES: Performed by: NURSE PRACTITIONER

## 2023-02-28 PROCEDURE — 85025 COMPLETE CBC W/AUTO DIFF WBC: CPT

## 2023-02-28 PROCEDURE — 74011250637 HC RX REV CODE- 250/637: Performed by: NURSE PRACTITIONER

## 2023-02-28 PROCEDURE — 80053 COMPREHEN METABOLIC PANEL: CPT

## 2023-02-28 PROCEDURE — 93306 TTE W/DOPPLER COMPLETE: CPT | Performed by: STUDENT IN AN ORGANIZED HEALTH CARE EDUCATION/TRAINING PROGRAM

## 2023-02-28 PROCEDURE — 74011636637 HC RX REV CODE- 636/637

## 2023-02-28 PROCEDURE — 93306 TTE W/DOPPLER COMPLETE: CPT

## 2023-02-28 RX ORDER — IBUPROFEN 600 MG/1
600 TABLET ORAL
Status: DISCONTINUED | OUTPATIENT
Start: 2023-02-28 | End: 2023-02-28 | Stop reason: HOSPADM

## 2023-02-28 RX ORDER — ALBUTEROL SULFATE 90 UG/1
2 AEROSOL, METERED RESPIRATORY (INHALATION)
Qty: 18 G | Refills: 0 | Status: SHIPPED | OUTPATIENT
Start: 2023-02-28

## 2023-02-28 RX ORDER — INSULIN PUMP SYRINGE, 3 ML
EACH MISCELLANEOUS
Qty: 1 KIT | Refills: 0 | Status: SHIPPED | OUTPATIENT
Start: 2023-02-28

## 2023-02-28 RX ORDER — INSULIN GLARGINE 100 [IU]/ML
35 INJECTION, SOLUTION SUBCUTANEOUS
Qty: 10.5 ML | Refills: 1 | Status: SHIPPED | OUTPATIENT
Start: 2023-02-28 | End: 2023-03-30

## 2023-02-28 RX ORDER — AMLODIPINE BESYLATE AND BENAZEPRIL HYDROCHLORIDE 10; 40 MG/1; MG/1
1 CAPSULE ORAL DAILY
Qty: 30 CAPSULE | Refills: 1 | Status: SHIPPED | OUTPATIENT
Start: 2023-02-28 | End: 2023-03-30

## 2023-02-28 RX ORDER — METOPROLOL TARTRATE 25 MG/1
25 TABLET, FILM COATED ORAL 2 TIMES DAILY
Qty: 60 TABLET | Refills: 0 | Status: SHIPPED | OUTPATIENT
Start: 2023-02-28 | End: 2023-03-30

## 2023-02-28 RX ORDER — METFORMIN HYDROCHLORIDE 500 MG/1
500 TABLET, EXTENDED RELEASE ORAL
Qty: 30 TABLET | Refills: 0 | Status: SHIPPED | OUTPATIENT
Start: 2023-02-28 | End: 2023-03-30

## 2023-02-28 RX ADMIN — INSULIN HUMAN 22 UNITS: 100 INJECTION, SUSPENSION SUBCUTANEOUS at 11:04

## 2023-02-28 RX ADMIN — IBUPROFEN 600 MG: 600 TABLET, FILM COATED ORAL at 06:56

## 2023-02-28 RX ADMIN — HYDROCHLOROTHIAZIDE 25 MG: 25 TABLET ORAL at 10:51

## 2023-02-28 RX ADMIN — INSULIN LISPRO 10 UNITS: 100 INJECTION, SOLUTION INTRAVENOUS; SUBCUTANEOUS at 07:55

## 2023-02-28 RX ADMIN — AMLODIPINE BESYLATE 10 MG: 5 TABLET ORAL at 10:51

## 2023-02-28 RX ADMIN — LISINOPRIL 40 MG: 20 TABLET ORAL at 10:52

## 2023-02-28 RX ADMIN — HYDRALAZINE HYDROCHLORIDE 20 MG: 20 INJECTION INTRAMUSCULAR; INTRAVENOUS at 06:58

## 2023-02-28 RX ADMIN — INSULIN LISPRO 10 UNITS: 100 INJECTION, SOLUTION INTRAVENOUS; SUBCUTANEOUS at 11:05

## 2023-02-28 RX ADMIN — METFORMIN HYDROCHLORIDE 500 MG: 500 TABLET ORAL at 07:50

## 2023-02-28 NOTE — ED NOTES
Pt has refused Lovenox injections and states she prefers to have ibuprofen over acetaminophen for her headaches.   Notified MD via [employer]

## 2023-02-28 NOTE — PROGRESS NOTES
[facility]                    Cardiology Care Note     []Initial Encounter     [x]Follow-up    Patient Name: Lroi Parsons - :1965 - CYZ:[id number]  Primary Cardiologist: [facility] Phys[facility]:    Consulting Cardiologist: [facility] Phys[facility]: Sridevi Colon MD     Reason for encounter: CP, HTN, LBBB    HPI:       Lori Parsons is a 56 y.o. female with PMH significant for HTN, HLD, DM2 admitted with CP, hypertensive urgency. Reports being very busy at home, caring for an elderly aunt and mother. Has a history of hypertension, diabetes. Has not seen a doctor since pre-COVID. Presented with escalating chest pain found to have a hypertensive urgency. She has significant glucosuria, her hemoglobin A1c is greater than 12. She has been initiated on an antihypertensive regimen, as well as metformin and insulin. Her EKG shows sinus rhythm with left bundle branch block. This is newly appreciated. She reports a cough, no current chest pain. High-sensitivity troponin 24. . Subjective:      Tova Ashton reports  headache . Assessment and Plan       1. Chest pain, hypertensive urgency-has been off medications for over 2 years. Restarted and antihypertensive regimen, lisinopril 40, amlodipine 10, hydrochlorothiazide 25-her blood pressure has decreased nicely. Nuclear stress test is negative for ischemia. EF 53%, ECHO pending    2. Hypertension -restarted on amlodipine 10, hydrochlorothiazide 25 lisinopril 40. Blood pressure currently 140/90, approximately. May need to escalate regimen from here. 3.  Uncontrolled diabetes-A1c greater than 12. Restarted on metformin and sliding scale insulin. Defer to primary team for diabetic regimen. 4.  Left bundle branch block-echo pending     5. ALVAREZ -needs to reestablish with pulmonary to obtain home CPAP.   She had to return hers after she was not using it. Has OP follow up with cardiology 3/14       ____________________________________________________________    Cardiac testing    02/26/23    NUCLEAR CARDIAC STRESS TEST 02/27/2023 2/27/2023    Interpretation Summary    Nuclear Findings: LV perfusion is probably normal. There is no evidence of inducible ischemia. Nuclear Findings: There is a mild severity left ventricular stress perfusion defect that is small in size present in the mid anterior segment(s). ECG: Resting ECG demonstrates normal sinus rhythm and left bundle branch block. Stress Test: A pharmacological stress test was performed using lexiscan. Blood pressure demonstrated a normal response and heart rate demonstrated a normal response to stress. The patient's heart rate recovery was normal. The patient reported no symptoms during the stress test.    Resting ejection fraction was 53%. Resting ECG: The ECG shows sinus rhythm. ECG demonstrates left bundle branch block. Stress ECG: There were no arrhythmias during stress. Non-specific ST abnormality noted. There were no noted arrhythmias during recovery. Perfusion Defect: There is a mild severity left ventricular stress perfusion defect that is small in size present in the mid anterior segment(s). Attenuation versus true defect. Rest Function: Left ventricular function at rest was normal. Resting ejection fraction was 53%.     Signed by: Hunter Lawler MD on 2/27/2023  3:29 PM            Most recent HS troponins:  Recent Labs     02/26/23  1602   TROPHS 24         ECG: normal sinus rhythm, LBBB    Review of Systems:    [x]All other systems reviewed and all negative except as written in HPI    [] Patient unable to provide secondary to condition    Past Medical History:   Diagnosis Date    Asthma     Bronchitis     Diabetes ([address]     Type II, no insulin    Hypertension     No medication taken per patient     Past Surgical History:   Procedure Laterality Date    HX GYN  1984 and 1992    C-sections    HX GYN      Uterine ablation     Social Hx:  reports that she has never smoked. She has never used smokeless tobacco. She reports that she does not drink alcohol and does not use drugs. Family Hx: family history includes Hypertension in her father and mother. Allergies   Allergen Reactions    Latex Other (comments)     peeling          OBJECTIVE:  Wt Readings from Last 3 Encounters:   02/27/23 110.2 kg (243 lb)   08/08/22 105 kg (231 lb 7.7 oz)   09/29/18 108.9 kg (240 lb)     No intake or output data in the 24 hours ending 02/28/23 0831    Physical Exam:    Vitals:   Vitals:    02/28/23 0658 02/28/23 0700 02/28/23 0730 02/28/23 0800   BP: (!) 164/63  (!) 148/53 (!) 153/65   Pulse: 89 86 81 84   Resp:   17 17   Temp:   98.8 °F (37.1 °C)    SpO2:   96% 95%   Weight:       Height:         Telemetry: normal sinus rhythm    Gen: Well-developed, well-nourished, in no acute distress  Neck: Supple, No JVD, No Carotid Bruit  Resp: No accessory muscle use, Clear breath sounds, No rales or rhonchi  Card: Regular Rate,Rythm, Normal S1, S2, No murmurs, rubs or gallop. Abd:   Soft, non-tender, non-distended, BS+   MSK: No cyanosis  Skin: No rashes    Neuro: Moving all four extremities, follows commands appropriately  Psych: Good insight, oriented to person, place, alert, Nml Affect  LE: No edema    Data Review:     Radiology:   XR Results (most recent):  Results from Hospital Encounter encounter on 02/26/23    XR CHEST PA LAT    Narrative  INDICATION:  chest pain    Exam: Chest 2 views. Comparison: 12/6/2012. Findings: Cardiomediastinal silhouette is normal. Pulmonary vasculature is not  engorged. No focal parenchymal opacities, effusions, or pneumothorax. Bony  thorax is intact. Impression  1.  No acute cardiopulmonary disease      Recent Labs     02/28/23  0700 02/26/23  1602   * 136   K 3.6 3.7    100   CO2 25 30   BUN 25* 12   CREA 1.42* 0.88   * 350*   CA 9.0 9.2       Recent Labs     02/28/23  0700 02/26/23  1602   WBC 8.3 7.2   HGB 13.5 14.8   HCT 41.7 46.7    225       Recent Labs     02/28/23  0700 02/26/23  1602    169*       No results for input(s): CHOL, LDLC in the last 72 hours.     No lab exists for component: TGL, HDLC,  HBA1C      Current meds:    Current Facility-Administered Medications:     ibuprofen (MOTRIN) tablet 600 mg, 600 mg, Oral, Q6H PRN, Meghan Solis NP, 600 mg at 02/28/23 0656    lisinopriL (PRINIVIL, ZESTRIL) tablet 40 mg, 40 mg, Oral, DAILY, Ludmila Mcwilliams MD, 40 mg at 02/27/23 5078    hydroCHLOROthiazide (HYDRODIURIL) tablet 25 mg, 25 mg, Oral, DAILY, Ludmila Mcwilliams MD, 25 mg at 02/27/23 2564    metFORMIN (GLUCOPHAGE) tablet 500 mg, 500 mg, Oral, BID WITH MEALS, Ludmila Mcwilliams MD, 500 mg at 02/28/23 0750    insulin glargine (LANTUS) injection 33 Units, 0.3 Units/kg, SubCUTAneous, QHS, Ludmila Mcwilliams MD, 33 Units at 02/27/23 2145    nystatin (MYCOSTATIN) 100,000 unit/gram powder, , Topical, BID, Ludmila Mcwilliams MD, Given at 02/27/23 1004    sodium chloride (NS) flush 5-40 mL, 5-40 mL, IntraVENous, Q8H, Ludmila Mcwilliams MD, 10 mL at 02/27/23 2148    sodium chloride (NS) flush 5-40 mL, 5-40 mL, IntraVENous, PRN, Ludmila Mcwilliams MD    acetaminophen (TYLENOL) tablet 650 mg, 650 mg, Oral, Q6H PRN, 650 mg at 02/26/23 2058 **OR** acetaminophen (TYLENOL) suppository 650 mg, 650 mg, Rectal, Q6H PRN, Ludmila Mcwilliams MD    polyethylene glycol (MIRALAX) packet 17 g, 17 g, Oral, DAILY PRN, Ludmila Mcwilliams MD    senna (SENOKOT) tablet 8.6 mg, 1 Tablet, Oral, DAILY PRN, Ludmila Mcwilliams MD    promethazine (PHENERGAN) tablet 12.5 mg, 12.5 mg, Oral, Q6H PRN **OR** ondansetron (ZOFRAN) injection 4 mg, 4 mg, IntraVENous, Q6H PRN, Ludmila Mcwilliams MD, 4 mg at 02/27/23 1640    insulin lispro (HUMALOG) injection, , SubCUTAneous, AC&HS, Ludmila Mcwilliams MD, 10 Units at 02/28/23 0755    glucose chewable tablet 16 g, 4 Tablet, Oral, PRN, Florencio Alfred MD    glucagon (GLUCAGEN) injection 1 mg, 1 mg, IntraMUSCular, PRN, Florencio Alfred MD    dextrose 10% infusion 0-250 mL, 0-250 mL, IntraVENous, PRN, Florencio Alfred MD    enoxaparin (LOVENOX) injection 30 mg, 30 mg, SubCUTAneous, Q12H, Florencio Alfred MD    amLODIPine (NORVASC) tablet 10 mg, 10 mg, Oral, DAILY, Florencio Alfred MD, 10 mg at 02/27/23 0817    hydrALAZINE (APRESOLINE) 20 mg/mL injection 20 mg, 20 mg, IntraVENous, Q6H PRN, Florencio Alfred MD, 20 mg at 02/28/23 9019    albuterol-ipratropium (DUO-NEB) 2.5 MG-0.5 MG/3 ML, 3 mL, Nebulization, Q6H PRN, Florencio Alfred MD, 3 mL at 02/27/23 1641    Current Outpatient Medications:     amLODIPine (Norvasc) 10 mg tablet, Take 1 Tablet by mouth daily for 30 days. , Disp: 30 Tablet, Rfl: 0    metFORMIN ER (GLUCOPHAGE XR) 500 mg tablet, Take 1 Tablet by mouth daily (with dinner). (Patient not taking: Reported on 8/9/2022), Disp: 30 Tablet, Rfl: 0    fluticasone propionate (FLONASE) 50 mcg/actuation nasal spray, 2 Sprays by Both Nostrils route daily. (Patient not taking: Reported on 8/9/2022), Disp: , Rfl:     albuterol (PROVENTIL HFA, VENTOLIN HFA, PROAIR HFA) 90 mcg/actuation inhaler, Take  by inhalation. (Patient not taking: Reported on 8/9/2022), Disp: , Rfl:     ibuprofen (MOTRIN) 800 mg tablet, Take 1 Tab by mouth every eight (8) hours as needed for Pain. (Patient not taking: Reported on 8/9/2022), Disp: 30 Tab, Rfl: 0    diazePAM (VALIUM) 5 mg tablet, Take 1 Tab by mouth every eight (8) hours as needed (spasm). Max Daily Amount: 15 mg.  (Patient not taking: Reported on 8/9/2022), Disp: 15 Tab, Rfl: 0    Arlys Macho, NP    [facility] Cardiology  Call center: (C) [phone]  (Q) [phone]      CC:None

## 2023-02-28 NOTE — DIABETES MGMT
[facility]  DIABETES MANAGEMENT CONSULT    Consulted by Provider for advanced nursing evaluation and care for inpatient blood glucose management. Evaluation and Action Plan   This 56 y.o. female with essentially new diagnosis of Type 2 diabetes is admitted with hypertension and headache. HTN improved at this time, but BGs still elevated. She states she was given a diagnosis of pre-diabetes before Covid and has never followed up or been on any type of diabetes medications. She has also been helping to take care of her mother and sister, but states she is about to move into her own place and will then live alone. 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 I explained to her the significance of her A1c (12.4%) and elevated BGs. Explained that I would recommend insulin going home. She was open to this and states \"I'll do whatever I have to do\". Instructed briefly on checking BGs and using insulin pens, as well survival skills of what to do when she has low BG or if consistently too high. Discussed healthy plate and CHO portion control. Will put I referral for outpatient PDH classes, which I also discussed with patient. She states she is in process of getting a new PCP. Possible plan for discharge today. She received at total of 64 total units of insulin yesterday. . Will give her a one time dose of NPH to get blood glucose down further (lasting 12 hours) until she receives her next Lantus dose tonight. She has been started on Metformin. She could likely use a 0.5 unit/kg/D basal dose, but will send her home on a safer 0.4 dose, plus Metformin. Nurses aware of reinforcement of teaching patient about checking BGs, giving insulin, and watching what she eats.     Management Rationale Action Plan   Medication   Basal needs Using 0.4 units/kg/D based on weight 45 units Lantus daily   Corrective insulin Using IR sensitivity based on weight and renal         Diabetes Discharge Plan   Medication: Lantus pens 45 units at bedtime                        Metformin 500 mg bid   Referral  [x]        Outpatient diabetes education   Additional orders:  Prescription for glucometer kit (Meter, Lancets (100), Strips (100)). Patient to obtain a blood glucose reading four times daily. First thing in the morning prior to eating and drinking anything then before lunch, dinner and bedtime. Create a log and present to PCP for interpretation. 2. Pen Needle, Diabetic 32 Gauge x 5/32\" (1 box)   3. Follow up with new PCP            Initial Presentation   Angelia Deleon is a 56 y.o. female admitted on 2/26/23 after experiencing severe headache and hypertension. LAB:, A1c 12.4%, NT pro-, Alk. Phos 169  CT head:IMPRESSION  No acute intracranial process. CXR: IMPRESSION  1. No acute cardiopulmonary disease       HX:   Past Medical History:   Diagnosis Date    Asthma     Bronchitis     Diabetes (HCC)     Type II, no insulin    Hypertension     No medication taken per patient        INITIAL DX:   Malignant hypertension [I10]     Current Treatment     TX: BP management, cardiology consult, basal/correctional insulin, DVT prophylaxis, asthma meds    Hospital Course   Clinical progress has been complicated by hyperglycemia. Diabetes History   Diagnosed with pre-diabetes just before Covid, but did not want medication and did not go back to see a provider. Both parents had diabetes, plus other family members. Diabetes-related Medical History  Acute complications  Hyperglycemia  Neurological complications  Peripheral neuropathy    Diabetes Medication History  Key Antihyperglycemic Medications               metFORMIN ER (GLUCOPHAGE XR) 500 mg tablet Take 1 Tablet by mouth daily (with dinner).              Diabetes self-management practices:   Eating pattern   [x] Not eating a carbohydrate-controlled meal plan  [x] Breakfast  Drinks hot tea (flavored)  [x] Lunch   Salad provided from work  [x] Dinner   Casserole (containing rice or potatoes or noodles)  [x] Bedtime  Fruit   [x] Snacks   Fruit , turkey sandwich  [x] Beverages  Tea, water, zero sodas  [x] Dentition status No issues  Physical activity pattern   Does not have time right now  Monitoring pattern   Not testing at the moment  Taking medications pattern  No medications  Reducing risks  [] Influenza: There is no immunization history on file for this patient. [] Pneumonia:   There is no immunization history on file for this patient. [] Hepatitis:   There is no immunization history on file for this patient. Social determinants of health impacting diabetes self-management practices   Concerned that you need to know more about how to stay healthy with diabetes  Overall evaluation:    [x] Not achieving A1c target with drug therapy & self-care practices: A1c 12.4%    Subjective   I don't want to give the insulin in my stomach.  (explained to her that this is the best place to do it)     Objective   Physical exam  General Obese female in no acute distress. Conversant and cooperative  Neuro  Alert, oriented   Vital Signs Visit Vitals  BP (!) 153/65   Pulse 84   Temp 98.8 °F (37.1 °C)   Resp 17   Ht 5' 3\" (1.6 m)   Wt 110.2 kg (243 lb)   SpO2 95%   BMI 43.05 kg/m²     Skin  Warm and dry. Acanthosis noted along neckline. No lipohypertrophy or lipoatrophy noted at injection sites   Heart   Regular rate and rhythm.  No murmurs, rubs or gallops  Lungs  Clear to auscultation without rales or rhonchi  Extremities No foot wounds    Diabetic foot exam:    Left Foot     Visual Exam: normal    Pulse DP: 2+ (normal)  Right Foot   Visual Exam: normal    Pulse DP: 2+ (normal)  Laboratory  Recent Labs     02/28/23  0700 02/26/23  1602   * 350*   AGAP 7 6   WBC 8.3 7.2   CREA 1.42* 0.88   AST 9* 26   ALT 9* 32       Factors impacting BG management  Factor Dose Comments   Nutrition:  Standard meals     60 grams/meal      Pain PRN tylenol    Other: Kidney function  Liver function  Family stress   TEENA  AST 9      Blood glucose pattern    Significant diabetes-related events over the past 24-72 hours  Admission   Received 64 total units of insulin yesterday   this am    Assessment and Nursing Intervention   Nursing Diagnosis Risk for unstable blood glucose pattern   Nursing Intervention Domain 5250 Decision-making Support   Nursing Interventions Examined current inpatient diabetes/blood glucose control   Explored factors facilitating and impeding inpatient management  Explored corrective strategies with patient and responsible inpatient provider   Informed patient of rational for insulin strategy while hospitalized     Nursing Diagnosis 10849 Ineffective Health Management   Nursing Intervention Domain 5250 Decision-making Support   Nursing Interventions Identified diabetes self-management practices impeding diabetes control  Discussed diabetes survival skills related to  Healthy Plate eating plan; given handouts  Role of physical activity in improving insulin sensitivity and action  Procedure for blood glucose monitoring & options for low-cost products  Medications plan at discharge     Billing Code(s)   [x] 26175 IP subsequent hospital care - 50 minutes   [] 01489 IP subsequent hospital care - 35 minutes   [] [phone] IP subsequent hospital care - 25 minutes   [] 20638 IP initial hospital care - 40 minutes     Before making these care recommendations, I personally reviewed the hospitalization record, including notes, laboratory & diagnostic data and current medications, and examined the patient at the bedside (circumstances permitting) before determining care. More than fifty (50) percent of the time was spent in patient counseling and/or care coordination.   Total minutes: [address], CNS  Diabetes Clinical Nurse Specialist  [facility]  Access via [facility]

## 2023-02-28 NOTE — ED NOTES
Verbal shift change report given to Nellie Be rn (oncoming nurse) by Evan King RN (offgoing nurse). Report included the following information SBAR, ED Summary, and MAR.

## 2023-02-28 NOTE — DISCHARGE SUMMARY
Pawan Ansari dior [address]  [address] [facility], [address] [place] [address]  Tel: [phone]    Hospital Medicine Discharge Summary    Patient ID:    Colletta Hilding  Age:              56 y.o.    : 1965  MRN:             [id number]     PCP: None     Date of Admission: 2023    Date of Discharge:  2023    Discharge Diagnoses:  Principal Problem:    Malignant hypertension (2023)    DM (diabetes mellitus), type 2 with neurological complications ([facility] ()    Asthma, mild intermittent (2023)    Osteoarthritis of left knee (2023)    ALVAREZ (obstructive sleep apnea) (2023)    Bilateral headaches (2023)    LBBB (left bundle branch block) (2023)    Reason for admission:    Malignant hypertension [I10]    Hospital Course:     Ms. Jomar Wallace is a 56 y.o. admitted to [facility] and treated for the following:    Malignant hypertension (2023) / Bilateral headaches (2023) POA: has been off medications x 2-3 years. Admitted with headaches and chest discomfort. Symptoms resolved. Continue Lotrel, Metoprolol. Had taken a dose of HCTZ with a slight bump in her creatinine and given risk of worsening, this was discontinued. Advised follow up as instructed. DM (diabetes mellitus), type 2 with neurological complications ([facility] () POA: uncontrolled. Was not taking any medications x 2 years. A1c 12.4. Counseled on diet. Seen by DM team. Continue Metformin and Lantus for now. Will likely need need adjustments during follow up. DM diet     LBBB (left bundle branch block) (2023) POA: new. Troponin neg. Given her chest discomfort, seen by cardiology and had a Lexiscan stress test that was normal. Echo showed a normal EF. Discharged in stable condition. Asthma, mild intermittent (2023) POA: not wheezing. Continue Albuterol PRN      Osteoarthritis of left knee (2023) POA: Tylenol PRN.       ALVAREZ (obstructive sleep apnea) (2/26/2023) POA: she will need outpatient sleep study. Outpatient follow up with PCP    Most recent labs:  Recent Labs     02/28/23  0700 02/26/23  1602   WBC 8.3 7.2   HGB 13.5 14.8   HCT 41.7 46.7    225     Recent Labs     02/28/23  0700 02/26/23  1602   * 136   K 3.6 3.7    100   CO2 25 30   * 350*   BUN 25* 12   CREA 1.42* 0.88   CA 9.0 9.2   ALB 3.0* 3.5   ALT 9* 32     No components found for: [place]    Discharge Exam:  Visit Vitals  BP (!) 143/68   Pulse 80   Temp 98 °F (36.7 °C)   Resp 16   Ht 5' 3\" (1.6 m)   Wt 110.2 kg (243 lb)   LMP 12/07/2016 (Approximate)   SpO2 98%   BMI 43.05 kg/m²        Patient has been seen and examined. General: well looking and in no acute distress  Pulm: clear breath sounds without wheezes  Card: no murmurs, normal S1, S2 without thrills, bruits   Abd:    soft, non-tender, normoactive bowel sounds  Skin: no rashes and skin turgor is good  Neuro: awake, alert and has a non focal     Activity: Activity as tolerated    Diet: Diabetic Diet    Current Discharge Medication List        START taking these medications    Details   amLODIPine-benazepril (LOTREL) 10-40 mg per capsule Take 1 Capsule by mouth daily for 30 days. Qty: 30 Capsule, Refills: 1      insulin glargine (LANTUS,BASAGLAR) 100 unit/mL (3 mL) inpn 35 Units by SubCUTAneous route nightly for 30 days. Indications: type 2 diabetes mellitus  Qty: 10.5 mL, Refills: 1      metoprolol tartrate (LOPRESSOR) 25 mg tablet Take 1 Tablet by mouth two (2) times a day for 30 days.  Indications: high blood pressure  Qty: 60 Tablet, Refills: 0      Blood-Glucose Meter monitoring kit Check your blood glucose three times before meals  Qty: 1 Kit, Refills: 0      lancets-blood glucose strips 30 gauge cmpk Check your blood glucose three times before meals  Qty: 200 Dose Pack, Refills: 0           CONTINUE these medications which have CHANGED    Details   metFORMIN ER (GLUCOPHAGE XR) 500 mg tablet Take 1 Tablet by mouth daily (with dinner) for 30 days. Indications: type 2 diabetes mellitus  Qty: 30 Tablet, Refills: 0      albuterol (PROVENTIL HFA, VENTOLIN HFA, PROAIR HFA) 90 mcg/actuation inhaler Take 2 Puffs by inhalation every six (6) hours as needed for Wheezing. Qty: 18 g, Refills: 0           STOP taking these medications       fluticasone propionate (FLONASE) 50 mcg/actuation nasal spray Comments:   Reason for Stopping:         ibuprofen (MOTRIN) 800 mg tablet Comments:   Reason for Stopping:         diazePAM (VALIUM) 5 mg tablet Comments:   Reason for Stopping: [place] Sam [id number] [phone]  Schedule an appointment as soon as possible for a visit in 3 days  for re evaluation of blood pressure. [facility] EMERGENCY [facility]PT  [address] [facility]  [phone]    If symptoms worsen    [facility] [place], [address] [place] [address]  [phone]    Follow up on 3/14/2023  9:40 am    None  None ([phone]) Patient stated that they have no PCP          Follow-up tests or labs: None    Discharge Condition: Stable    Disposition: home    Time taken to co-ordinate and arrange discharge:  35 minutes.     Signed:  Stephanie Harrison MD       2/28/2023   1:10 PM

## 2023-02-28 NOTE — ED NOTES
Verbal shift change report given to Grayson Byers (oncoming nurse) by Nedra Mehta RN (offgoing nurse). Report included the following information SBAR, Kardex, ED Summary, Intake/Output, MAR and Accordion.

## 2023-03-13 NOTE — PROGRESS NOTES
Patient: Favian Potts  : 1965    Primary Cardiologist: Heath Hidalgo MD  PCP: Rachelle Gibbs MD    Today's Date: 3/14/2023      ASSESSMENT AND PLAN:     Assessment and Plan:  Hospital Follow Up  - admitted from -23 for hypertensive emergency     2. HTN  - continue amlodipine-benazepril 10-40mg  - she was taking HCTZ and had increase in creatinine to 1.48 --> will recheck bmp  - will switch metoprolol tartrate 25 mg BID to coreg 6.125 mg BID  - she will monitor BP at home, gave BP log and instructions for BP monitoring    3. HCM   - on echo from 23   - need to control HTN - could consider cMRI later    3. LBBB    4. DM  - hgb A1C >12%  - on metformin, insulin per PCP    5. ALVAREZ   - needs home CPAP   - gave referral for sleep medicine to get reevaluated         ICD-10-CM ICD-9-CM    1. Essential hypertension  L45 270.3 METABOLIC PANEL, BASIC      carvediloL (COREG) 6.25 mg tablet      METABOLIC PANEL, BASIC      2. Hyperlipidemia, unspecified hyperlipidemia type  E78.5 272.4       3. ALVAREZ (obstructive sleep apnea)  G47.33 327.23 SLEEP MEDICINE REFERRAL      4. Hypertrophic cardiomyopathy (HCC)  I42.2 425.18            HISTORY OF PRESENT ILLNESS:     History of Present Illness:  Favian Potts is a 56 y.o. F who presents for hospital follow-up. Patient was admitted to [facility] for chest pain, hypertensive emergency on -23. Workup included nuclear stress test which was negative for ischemia. Echo showed normal LVEF and was suggestive of hypertrophic cardiomyopathy. Discussed with patient the importance of BP control. She has a history of sleep apnea, however, needs to be reevaluated. Also, has a history of asthma so has shortness of breath and associated chest tightness. Denies edema, syncope. Has no tachycardia, palpitations or sense of arrhythmia.      PAST MEDICAL HISTORY:     Past Medical History:   Diagnosis Date    Asthma     Bronchitis     Diabetes ([facility] [address] Type II, no insulin    Hypertension     No medication taken per patient       Past Surgical History:   Procedure Laterality Date    HX GYN  1984 and 1992    C-sections    HX GYN      Uterine ablation       CURRENT MEDICATIONS:    .  Current Outpatient Medications   Medication Sig Dispense Refill    amLODIPine-benazepril (LOTREL) 10-40 mg per capsule Take 1 Capsule by mouth daily. 30 Capsule 5    carvediloL (COREG) 6.25 mg tablet Take 1 Tablet by mouth two (2) times daily (with meals). 60 Tablet 2    metFORMIN ER (GLUCOPHAGE XR) 500 mg tablet Take 1 Tablet by mouth daily (with dinner) for 30 days. Indications: type 2 diabetes mellitus 30 Tablet 0    insulin glargine (LANTUS,BASAGLAR) 100 unit/mL (3 mL) inpn 35 Units by SubCUTAneous route nightly for 30 days. Indications: type 2 diabetes mellitus 10.5 mL 1    metoprolol tartrate (LOPRESSOR) 25 mg tablet Take 1 Tablet by mouth two (2) times a day for 30 days. Indications: high blood pressure 60 Tablet 0    Blood-Glucose Meter monitoring kit Check your blood glucose three times before meals 1 Kit 0    lancets-blood glucose strips 30 gauge cmpk Check your blood glucose three times before meals 200 Dose Pack 0    albuterol (PROVENTIL HFA, VENTOLIN HFA, PROAIR HFA) 90 mcg/actuation inhaler Take 2 Puffs by inhalation every six (6) hours as needed for Wheezing.  18 g 0       Allergies   Allergen Reactions    Latex Other (comments)     peeling         SOCIAL HISTORY:     Social History     Tobacco Use    Smoking status: Never    Smokeless tobacco: Never   Substance Use Topics    Alcohol use: No    Drug use: No       FAMILY HISTORY:     Family History   Problem Relation Age of Onset    Hypertension Mother     Hypertension Father        REVIEW OF SYMPTOMS:     Review of Symptoms:  Negative except as above, all other systems reviewed and are negative for a Comprehensive ROS (10+)    PHYSICAL EXAM:     Physical Exam:  Visit Vitals  BP (!) 148/76 (BP 1 Location: Left upper arm, BP Patient Position: Sitting, BP Cuff Size: Large adult)   Pulse 60   Ht 5' 3\" (1.6 m)   Wt 227 lb (103 kg)   LMP 12/07/2016 (Approximate)   SpO2 97%   BMI 40.21 kg/m²       General appearance: alert, cooperative, no distress, appears stated age  Neck: supple, symmetrical, trachea midline, no adenopathy, thyroid: not enlarged, symmetric, no tenderness/mass/nodules, no carotid bruit, and no JVD  Lungs: clear to auscultation bilaterally  Heart: regular rate and rhythm, S1, S2 normal, no murmur, click, rub or gallop  Extremities: extremities normal, atraumatic, no cyanosis or edema    LABS / OTHER STUDIES:     Lab Results   Component Value Date/Time    Sodium 133 (L) 02/28/2023 07:00 AM    Potassium 3.6 02/28/2023 07:00 AM    Chloride 101 02/28/2023 07:00 AM    CO2 25 02/28/2023 07:00 AM    Anion gap 7 02/28/2023 07:00 AM    Glucose 353 (H) 02/28/2023 07:00 AM    BUN 25 (H) 02/28/2023 07:00 AM    Creatinine 1.42 (H) 02/28/2023 07:00 AM    BUN/Creatinine ratio 18 02/28/2023 07:00 AM    GFR est AA >60 02/13/2018 11:36 PM    GFR est non-AA >60 02/13/2018 11:36 PM    Calcium 9.0 02/28/2023 07:00 AM    Bilirubin, total 0.6 02/28/2023 07:00 AM    Alk. phosphatase 115 02/28/2023 07:00 AM    Protein, total 6.9 02/28/2023 07:00 AM    Albumin 3.0 (L) 02/28/2023 07:00 AM    Globulin 3.9 02/28/2023 07:00 AM    A-G Ratio 0.8 (L) 02/28/2023 07:00 AM    ALT (SGPT) 9 (L) 02/28/2023 07:00 AM    AST (SGOT) 9 (L) 02/28/2023 07:00 AM       No results found for: CHOL, CHOLPOCT, CHOLX, CHLST, CHOLV, TOTCHOLEXT, HDL, HDLPOC, HDLEXT, HDLP, LDL, LDLCPOC, LDLCEXT, LDLC, DLDLP, VLDLC, VLDL, TGLX, TRIGL, TRIGLYCEXT, TRIGP, TGLPOCT, CHHD, CHHDX    CARDIAC DIAGNOSTICS:     Cardiac Evaluation Includes:  I reviewed the test results below. 02/26/23    ECHO ADULT COMPLETE 02/28/2023 2/28/2023    Interpretation Summary    Left Ventricle: Normal left ventricular systolic function with a visually estimated EF of 65 - 70%.  Left ventricle is smaller than normal. Severely increased wall thickness. Findings consistent with concentric hypertrophy. Echocardiographic features are suggestive of hypertrophic cardiomyopathy. Right Ventricle: Not well visualized. Right ventricle size is normal. Normal wall thickness. Aortic Valve: Tricuspid valve. Moderate sclerosis of the aortic valve, left and noncoronary cusps. Mild stenosis of the aortic valve. AV mean gradient is 14 mmHg. AV area by continuity VTI is 1.9 cm2. Mitral Valve: Mildly thickened leaflet. Right Atrium: Right atrium is mildly dilated. Signed by: Josephine Joseph DO on 2/28/2023 10:32 AM      02/26/23    NUCLEAR CARDIAC STRESS TEST 02/27/2023 2/27/2023    Interpretation Summary    Nuclear Findings: LV perfusion is probably normal. There is no evidence of inducible ischemia. Nuclear Findings: There is a mild severity left ventricular stress perfusion defect that is small in size present in the mid anterior segment(s). ECG: Resting ECG demonstrates normal sinus rhythm and left bundle branch block. Stress Test: A pharmacological stress test was performed using lexiscan. Blood pressure demonstrated a normal response and heart rate demonstrated a normal response to stress. The patient's heart rate recovery was normal. The patient reported no symptoms during the stress test.    Resting ejection fraction was 53%. Resting ECG: The ECG shows sinus rhythm. ECG demonstrates left bundle branch block. Stress ECG: There were no arrhythmias during stress. Non-specific ST abnormality noted. There were no noted arrhythmias during recovery. Perfusion Defect: There is a mild severity left ventricular stress perfusion defect that is small in size present in the mid anterior segment(s). Attenuation versus true defect. Rest Function: Left ventricular function at rest was normal. Resting ejection fraction was 53%.     Signed by: Bessie Espino MD on 2/27/2023  3:29 PM           EKG Results       None Future Appointments   Date Time Provider Rosas Jannie   4/12/2023  9:20 AM ZAYNAB Ornelas CAVSF BS AMB         Eryn Volcano, [facility] [address], [address] [place] [address] Drive    Ph: [phone]

## 2023-03-14 ENCOUNTER — OFFICE VISIT (OUTPATIENT)
Dept: CARDIOLOGY CLINIC | Age: 58
End: 2023-03-14
Payer: MEDICAID

## 2023-03-14 VITALS
WEIGHT: 227 LBS | BODY MASS INDEX: 40.22 KG/M2 | SYSTOLIC BLOOD PRESSURE: 148 MMHG | DIASTOLIC BLOOD PRESSURE: 76 MMHG | HEART RATE: 60 BPM | HEIGHT: 63 IN | OXYGEN SATURATION: 97 %

## 2023-03-14 DIAGNOSIS — I42.2 HYPERTROPHIC CARDIOMYOPATHY (HCC): ICD-10-CM

## 2023-03-14 DIAGNOSIS — G47.33 OSA (OBSTRUCTIVE SLEEP APNEA): ICD-10-CM

## 2023-03-14 DIAGNOSIS — E78.5 HYPERLIPIDEMIA, UNSPECIFIED HYPERLIPIDEMIA TYPE: ICD-10-CM

## 2023-03-14 DIAGNOSIS — I10 ESSENTIAL HYPERTENSION: Primary | ICD-10-CM

## 2023-03-14 PROCEDURE — 99214 OFFICE O/P EST MOD 30 MIN: CPT

## 2023-03-14 PROCEDURE — 3077F SYST BP >= 140 MM HG: CPT

## 2023-03-14 PROCEDURE — 3078F DIAST BP <80 MM HG: CPT

## 2023-03-14 RX ORDER — CARVEDILOL 6.25 MG/1
6.25 TABLET ORAL 2 TIMES DAILY WITH MEALS
Qty: 60 TABLET | Refills: 2 | Status: SHIPPED | OUTPATIENT
Start: 2023-03-14

## 2023-03-14 RX ORDER — AMLODIPINE BESYLATE AND BENAZEPRIL HYDROCHLORIDE 10; 40 MG/1; MG/1
1 CAPSULE ORAL DAILY
Qty: 30 CAPSULE | Refills: 5 | Status: SHIPPED | OUTPATIENT
Start: 2023-03-14

## 2023-03-14 NOTE — PROGRESS NOTES
Chief Complaint   Patient presents with    [address] follow up; BP management per Dr. Marialuisa Vicente 2/26 HTN  Nuc 2/27  Echo 2/28    Chest Pain     Vitals:    03/14/23 1001 03/14/23 1012   BP: (!) 168/80 (!) 148/76   BP 1 Location: Left upper arm Left upper arm   BP Patient Position: Sitting Sitting   BP Cuff Size: Large adult Large adult   Pulse: 60    Height: 5' 3\" (1.6 m)    Weight: 227 lb (103 kg)    SpO2: 97%      Chest pain denied   SOB denied   Palpitations denied   Swelling in hands/feet denied   Dizziness denied   Recent hospital stays denied   Refills meds from hospital was only 30 days. When BP is high, gets h/a center of top of head. Has a wrist BP cuff at home. Didn't have needles for checking blood sugar. Hasn't had the insulin.     PCP:   Catarino Ortega  [id number]  [address]

## 2023-03-14 NOTE — PATIENT INSTRUCTIONS
Parameters:  Systolic (top number) [phone]  Diastolic (bottom number) 18-73  Heart rate       How to get your blood pressure checked:   Before  During  After    In the 30 minutes before your BP is taken:   NO Smoking   NO Caffeine   NO Exercise  Make sure the cuff is the right size and in the right place. Wait 5 minutes and retake your BP if needed. In case of machine error. Keep your cuffed arm on a flat surface, like a table, and at heart level. Cuff should be at heart level not your arm Keep a log and bring it to every check up. Sit STILL for 5 minutes prior to taking your BP. Sit upright, back straight, feet flat on the floor. Do not check your blood pressure more then once at a time, repeated attempts will only increase the numbers. Only take your B/P twice a day at least an hour after taking your medication. Preferably once in the morning and once in the evening. If you get 3 or more consecutive readings outside of these parameters or have symptoms please let us know so that we can adjust your medication. If you recently have started, stopped, or changed a medication dosage please give your body at least a week or two to get use to the change. Avoid high-sodium foods  Avoid eating:  Smoked, cured, salted, and canned meat, fish, and poultry. Ham, vega, hot dogs, and luncheon meats. Regular, hard, and processed cheese and regular peanut butter. Crackers with salted tops, and other salted snack foods such as pretzels, chips, and salted popcorn. Frozen prepared meals, unless labeled low-sodium. Canned and dried soups, broths, and bouillon, unless labeled sodium-free or low-sodium. Canned vegetables, unless labeled sodium-free or low-sodium. [place] fries, pizza, tacos, and other fast foods. Pickles, olives, ketchup, and other condiments, especially soy sauce, unless labeled sodium-free or low-sodium.

## 2023-03-15 LAB
BUN SERPL-MCNC: 14 MG/DL (ref 6–24)
BUN/CREAT SERPL: 20 (ref 9–23)
CALCIUM SERPL-MCNC: 9.8 MG/DL (ref 8.7–10.2)
CHLORIDE SERPL-SCNC: 102 MMOL/L (ref 96–106)
CO2 SERPL-SCNC: 22 MMOL/L (ref 20–29)
CREAT SERPL-MCNC: 0.7 MG/DL (ref 0.57–1)
EGFRCR SERPLBLD CKD-EPI 2021: 102 ML/MIN/1.73
GLUCOSE SERPL-MCNC: 278 MG/DL (ref 70–99)
POTASSIUM SERPL-SCNC: 4.5 MMOL/L (ref 3.5–5.2)
SODIUM SERPL-SCNC: 140 MMOL/L (ref 134–144)

## 2023-03-17 ENCOUNTER — TELEPHONE (OUTPATIENT)
Dept: CARDIOLOGY CLINIC | Age: 58
End: 2023-03-17

## 2023-03-17 NOTE — TELEPHONE ENCOUNTER
BERNIEM to convey the results below. ----- Message from Angeles Ayon sent at 3/15/2023 12:28 PM EDT -----  HI this is one of Dr. Beck Brandon patients - had labs yesterday can you let her know they are stable. Kidney function is better :)    Thanks!  Gerardo Lopez  ----- Message -----  From: Fausto, [facility] Lab Results In  Sent: 3/15/2023  11:36 AM EDT  To: ZAYNAB Patel

## 2023-03-20 ENCOUNTER — OFFICE VISIT (OUTPATIENT)
Dept: SLEEP MEDICINE | Age: 58
End: 2023-03-20
Payer: MEDICAID

## 2023-03-20 VITALS
WEIGHT: 228.2 LBS | HEART RATE: 72 BPM | HEIGHT: 63 IN | DIASTOLIC BLOOD PRESSURE: 85 MMHG | BODY MASS INDEX: 40.43 KG/M2 | OXYGEN SATURATION: 100 % | SYSTOLIC BLOOD PRESSURE: 196 MMHG

## 2023-03-20 DIAGNOSIS — G47.33 OSA (OBSTRUCTIVE SLEEP APNEA): Primary | ICD-10-CM

## 2023-03-20 DIAGNOSIS — I10 PRIMARY HYPERTENSION: ICD-10-CM

## 2023-03-20 PROCEDURE — 3079F DIAST BP 80-89 MM HG: CPT | Performed by: INTERNAL MEDICINE

## 2023-03-20 PROCEDURE — 3077F SYST BP >= 140 MM HG: CPT | Performed by: INTERNAL MEDICINE

## 2023-03-20 PROCEDURE — 99204 OFFICE O/P NEW MOD 45 MIN: CPT | Performed by: INTERNAL MEDICINE

## 2023-03-20 NOTE — PROGRESS NOTES
[address] [facility] [place]. [place], [address]  Tel.  [phone]  Fax. [address] [facility] 60  [place], [address] [facility]  Tel.  [phone]  Fax. [phone] [address] [facility] Tori Apple  Tel.  [phone]  Fax. [phone]       Yandel Bermudez is a 56y.o. year old female referred by Ms. Rachel Harmon NP  for evaluation of a sleep disorder. ASSESSMENT/PLAN:      ICD-10-CM ICD-9-CM    1. ALVAREZ (obstructive sleep apnea)  G47.33 327.23 SLEEP STUDY UNATTENDED, 4 CHANNEL      2. Primary hypertension  I10 401.9       3. BMI 40.0-44.9, adult [facility])  Z68.41 V85.41           Patient has a history and examination consistent with the diagnosis of sleep apnea. Follow-up and Dispositions    Return for telephone follow-up after testing is completed. * The patient currently has a Moderate Risk for having sleep apnea. STOP-BANG score 5.    * Sleep testing was ordered for initial evaluation. Orders Placed This Encounter    SLEEP STUDY UNATTENDED, 4 CHANNEL     Order Specific Question:   Reason for Exam     Answer:   ALVAREZ       * She was provided information on sleep apnea including corresponding risk factors and the importance of proper treatment. * Treatment options were reviewed in detail. she would like to proceed with PAP therapy (new device). Patient will be seen in follow-up in 6-8 weeks after PAP setup to gauge treatment response and adherence to therapy. * The patient was counseled regarding proper sleep hygiene, with emphasis on ensuring sufficient total sleep time; safe driving and the benefits of exercise and weight loss. * All of her questions were addressed.     2. Hypertension -  continue on current regimen - amLODIPine-benazepril (LOTREL) 10-40 mg per capsule and carvediloL (COREG) 6.25 mg tablet and metoprolol tartrate (LOPRESSOR) 25 mg tablet, she will continue to monitor her BP and follow up with her primary care provider for reevaluation/adjustment of medications if warranted. I have reviewed the relationship between hypertension as it relates to sleep-disordered breathing. 3. Recommended a dedicated weight loss program through appropriate diet and exercise regimen as significant weight reduction has been shown to reduce severity of obstructive sleep apnea. SUBJECTIVE/OBJECTIVE:    Nancy George is an 56 y.o. female referred for evaluation for a sleep disorder. She complains of snoring associated with awakening in the middle of the night because of urination. Symptoms began several years ago, she has previously diagnosed with ALVAREZ and treated with CPAP therapy. She sates that she had to surrender her device in 2019 and has not been on therapy  since that time. She reports of difficulties staying asleep at this time and is interested in re-evaluation. She usually can fall asleep in 30 minutes. Family or house members note snoring. She denies of symptoms indicative of cataplexy, sleep paralysis or sleep related hallucinations. She denies of a history of unusual movements occurring during sleep. Nancy George does wake up frequently at night. She is not bothered by waking up too early and left unable to get back to sleep. She actually sleeps about 4 hours at night and wakes up about -2 times during the night. She does work shifts: Other Shift. Clint Newton indicates she does get too little sleep at night. Her bedtime is  (varies). She awakens at  (varies). She does not take naps. She has the following observed behaviors: Loud snoring;  . Other remarks: The patient has not undergone diagnostic testing for the current problems.      Review of Systems:  Constitutional:  No significant weight loss or weight gain  Eyes:  No blurred vision  CVS:  No significant chest pain  Pulm:  No significant shortness of breath  GI:  No significant nausea or vomiting  :  No significant nocturia  Musculoskeletal:  No significant joint pain at night  Skin:  No significant rashes  Neuro:  No significant dizziness   Psych:  No active mood issues    Sleep Review of Systems: notable for Positive difficulty falling asleep; Positive awakenings at night; Negative perceived regular dreaming; Negative nightmares; Negative  early morning headaches; Negative  memory problems; Negative  concentration issues; Negative caffeine;  Negative alcohol;   Negative history of any automobile or occupational accidents due to daytime drowsiness. [place] Sleepiness Score: 12   and Modified F.O.S.Q. Score Total / 2: 19.5    Visit Vitals  BP (!) 196/85 (BP 1 Location: Left upper arm, BP Patient Position: Sitting)   Pulse 72   Ht 5' 3\" (1.6 m)   Wt 228 lb 3.2 oz (103.5 kg)   LMP 12/07/2016 (Approximate)   SpO2 100%   BMI 40.42 kg/m²    Neck circ. in \"inches\": 15.5      General:   Alert, oriented, not in acute distress   Eyes:  Anicteric Sclerae; intact EOM's   Nose:  No obvious nasal septum deviation    Oropharynx:   Mallampati score 4, thick tongue base, uvula not seen due to low-lying soft palate, narrow tonsilo-pharyngeal pilars, tongue scalloped   Neck:   midline trachea,  no JVD   Chest/Lungs:  symmetrical lung expansion ,clear lung fields on auscultation    CVS:  Normal rate, regular rhythm    Extremities:  No obvious rashes, absent edema    Neuro:  No focal deficits; No obvious tremor    Psych:  Normal eye contact; normal  affect, normal countenance          Nahomy Ocasio MD, FAASM  Diplomate American Board of Sleep Medicine  Diplomate in Sleep Medicine - ABP    Electronically signed.  03/20/23

## 2023-03-20 NOTE — PATIENT INSTRUCTIONS
[address] [place]. [place], [address]  Tel.  [phone]  Fax. [address] [address]  [address], [address]  Tel.  [phone]  Fax. [phone] [address]  Tel.  [phone]  Fax. [phone]     Sleep Apnea: After Your Visit  Your Care Instructions  Sleep apnea occurs when you frequently stop breathing for 10 seconds or longer during sleep. It can be mild to severe, based on the number of times per hour that you stop breathing or have slowed breathing. Blocked or narrowed airways in your nose, mouth, or throat can cause sleep apnea. Your airway can become blocked when your throat muscles and tongue relax during sleep. Sleep apnea is common, occurring in 1 out of 20 individuals. Individuals having any of the following characteristics should be evaluated and treated right away due to high risk and detrimental consequences from untreated sleep apnea:  Obesity  Congestive Heart failure  Atrial Fibrillation  Uncontrolled Hypertension  Type II Diabetes  Night-time Arrhythmias  Stroke  Pulmonary Hypertension  High-risk Driving Populations (pilots, truck drivers, etc.)  Patients Considering Weight-loss Surgery    How do you know you have sleep apnea? You probably have sleep apnea if you answer 'yes' to 3 or more of the following questions:  S - Have you been told that you Snore? T - Are you often Tired during the day? O - Has anyone Observed you stop breathing while sleeping? P- Do you have (or are being treated for) high blood Pressure? B - Are you obese (Body Mass Index > 35)? A - Is your Age 48years old or older? N - Is your Neck size greater than 16 inches? G - Are you male Gender? A sleep physician can prescribe a breathing device that prevents tissues in the throat from blocking your airway. Or your doctor may recommend using a dental device (oral breathing device) to help keep your airway open.  In some cases, surgery may be needed to remove enlarged tissues in the throat. Follow-up care is a key part of your treatment and safety. Be sure to make and go to all appointments, and call your doctor if you are having problems. It's also a good idea to know your test results and keep a list of the medicines you take. How can you care for yourself at home? Lose weight, if needed. It may reduce the number of times you stop breathing or have slowed breathing. Go to bed at the same time every night. Sleep on your side. It may stop mild apnea. If you tend to roll onto your back, sew a pocket in the back of your paRewardix top. Put a tennis ball into the pocket, and stitch the pocket shut. This will help keep you from sleeping on your back. Avoid alcohol and medicines such as sleeping pills and sedatives before bed. Do not smoke. Smoking can make sleep apnea worse. If you need help quitting, talk to your doctor about stop-smoking programs and medicines. These can increase your chances of quitting for good. Prop up the head of your bed 4 to 6 inches by putting bricks under the legs of the bed. Treat breathing problems, such as a stuffy nose, caused by a cold or allergies. Use a continuous positive airway pressure (CPAP) breathing machine if lifestyle changes do not help your apnea and your doctor recommends it. The machine keeps your airway from closing when you sleep. If CPAP does not help you, ask your doctor whether you should try other breathing machines. A bilevel positive airway pressure machine has two types of air pressureâone for breathing in and one for breathing out. Another device raises or lowers air pressure as needed while you breathe. If your nose feels dry or bleeds when using one of these machines, talk with your doctor about increasing moisture in the air. A humidifier may help.   If your nose is runny or stuffy from using a breathing machine, talk with your doctor about using decongestants or a corticosteroid nasal spray.  When should you call for help? Watch closely for changes in your health, and be sure to contact your doctor if:  You still have sleep apnea even though you have made lifestyle changes. You are thinking of trying a device such as CPAP. You are having problems using a CPAP or similar machine. Where can you learn more? Go to [employer]. Enter N113 in the search box to learn more about \"Sleep Apnea: After Your Visit. \"   © 3254-2856 Healthwise, Incorporated. Care instructions adapted under license by [employer] (which disclaims liability or warranty for this information). This care instruction is for use with your licensed healthcare professional. If you have questions about a medical condition or this instruction, always ask your healthcare professional. Kenzie Touree any warranty or liability for your use of this information. PROPER SLEEP HYGIENE    What to avoid  Do not have drinks with caffeine, such as coffee or black tea, for 8 hours before bed. Do not smoke or use other types of tobacco near bedtime. Nicotine is a stimulant and can keep you awake. Avoid drinking alcohol late in the evening, because it can cause you to wake in the middle of the night. Do not eat a big meal close to bedtime. If you are hungry, eat a light snack. Do not drink a lot of water close to bedtime, because the need to urinate may wake you up during the night. Do not read or watch TV in bed. Use the bed only for sleeping and sexual activity. What to try  Go to bed at the same time every night, and wake up at the same time every morning. Do not take naps during the day. Keep your bedroom quiet, dark, and cool. Get regular exercise, but not within 3 to 4 hours of your bedtime. .  Sleep on a comfortable pillow and mattress. If watching the clock makes you anxious, turn it facing away from you so you cannot see the time.   If you worry when you lie down, start a worry book. Well before bedtime, write down your worries, and then set the book and your concerns aside. Try meditation or other relaxation techniques before you go to bed. If you cannot fall asleep, get up and go to another room until you feel sleepy. Do something relaxing. Repeat your bedtime routine before you go to bed again. Make your house quiet and calm about an hour before bedtime. Turn down the lights, turn off the TV, log off the computer, and turn down the volume on music. This can help you relax after a busy day. Drowsy Driving  The [address] [place] [address] Road Traffic Safety Administration cites drowsiness as a causing factor in more than 667,732 police reported crashes annually, resulting in 76,000 injuries and 1,500 deaths. Other surveys suggest 55% of people polled have driven while drowsy in the past year, 23% had fallen asleep but not crashed, 3% crashed, and 2% had and accident due to drowsy driving. Who is at risk? Young Drivers: One study of drowsy driving accidents states that 55% of the drivers were under 25 years. Of those, 75% were male. Shift Workers and Travelers: People who work overnight or travel across time zones frequently are at higher risk of experiencing Circadian Rhythm Disorders. They are trying to work and function when their body is programed to sleep. Sleep Deprived: Lack of sleep has a serious impact on your ability to pay attention or focus on a task. Consistently getting less than the average of 8 hours your body needs creates partial or cumulative sleep deprivation. Untreated Sleep Disorders: Sleep Apnea, Narcolepsy, R.L.S., and other sleep disorders (untreated) prevent a person from getting enough restful sleep. This leads to excessive daytime sleepiness and increases the risk for drowsy driving accidents by up to 7 times. Medications / Alcohol: Even over the counter medications can cause drowsiness.  Medications that impair a drivers attention should have a warning label. Alcohol naturally makes you sleepy and on its own can cause accidents. Combined with excessive drowsiness its effects are amplified. Signs of Drowsy Driving:   * You don't remember driving the last few miles   * You may drift out of your akila   * You are unable to focus and your thoughts wander   * You may yawn more often than normal   * You have difficulty keeping your eyes open / nodding off   * Missing traffic signs, speeding, or tailgating  Prevention-   Good sleep hygiene, lifestyle and behavioral choices have the most impact on drowsy driving. There is no substitute for sleep and the average person requires 8 hours nightly. If you find yourself driving drowsy, stop and sleep. Consider the sleep hygiene tips provided during your visit as well. Medication Refill Policy: Refills for all medications require 1 week advance notice. Please have your pharmacy fax a refill request. We are unable to fax, or call in \"controled substance\" medications and you will need to pick these prescriptions up from our office. [employer] Activation    Thank you for requesting access to [employer]. Please follow the instructions below to securely access and download your online medical record. [employer] allows you to send messages to your doctor, view your test results, renew your prescriptions, schedule appointments, and more. How Do I Sign Up? In your internet browser, go to [email or web address]. Click on the First Time User? Click Here link in the Sign In box. You will see the New Member Sign Up page. Enter your [employer] Access Code exactly as it appears below. You will not need to use this code after youve completed the sign-up process. If you do not sign up before the expiration date, you must request a new code.     [employer] Access Code: [email or web address]  Expires: 2023  1:45 PM (This is the date your [employer] access code will )    Enter the last four digits of your Social Security Number (xxxx) and Date of Birth (mm/dd/yyyy) as indicated and click Submit. You will be taken to the next sign-up page. Create a [employer] ID. This will be your [employer] login ID and cannot be changed, so think of one that is secure and easy to remember. Create a [employer] password. You can change your password at any time. Enter your Password Reset Question and Answer. This can be used at a later time if you forget your password. Enter your e-mail address. You will receive e-mail notification when new information is available in [address] Click Sign Up. You can now view and download portions of your medical record. Click the [facility] link to download a portable copy of your medical information. Additional Information    If you have questions, please call [phone]. Remember, [employer] is NOT to be used for urgent needs. For medical emergencies, dial 911.

## 2023-04-12 ENCOUNTER — HOSPITAL ENCOUNTER (OUTPATIENT)
Dept: SLEEP MEDICINE | Age: 58
Discharge: HOME | End: 2023-04-12
Payer: MEDICAID

## 2023-04-12 PROCEDURE — G0399 HOME SLEEP TEST/TYPE 3 PORTA: HCPCS | Performed by: NEUROLOGICAL SURGERY

## 2023-04-30 ENCOUNTER — TELEPHONE (OUTPATIENT)
Dept: SLEEP MEDICINE | Age: 58
End: 2023-04-30

## 2023-04-30 DIAGNOSIS — G47.33 OSA (OBSTRUCTIVE SLEEP APNEA): Primary | ICD-10-CM

## 2023-05-15 ENCOUNTER — TELEPHONE (OUTPATIENT)
Age: 58
End: 2023-05-15

## 2023-05-15 NOTE — TELEPHONE ENCOUNTER
Called and spoke to patient informing her we faxed the PAP order to [facility] and provided her with the phone number for them. Also told her she should hear from them within 7-10 business days and to give us a call once she receives her new device so we can schedule her for a 1st adherence appointment.

## 2023-06-01 ENCOUNTER — TELEPHONE (OUTPATIENT)
Age: 58
End: 2023-06-01

## 2023-10-02 ENCOUNTER — TELEPHONE (OUTPATIENT)
Age: 58
End: 2023-10-02

## 2023-11-27 ENCOUNTER — APPOINTMENT (OUTPATIENT)
Facility: HOSPITAL | Age: 58
End: 2023-11-27
Payer: MEDICAID

## 2023-11-27 ENCOUNTER — HOSPITAL ENCOUNTER (EMERGENCY)
Facility: HOSPITAL | Age: 58
Discharge: HOME | End: 2023-11-27
Payer: MEDICAID

## 2023-11-27 VITALS
OXYGEN SATURATION: 99 % | RESPIRATION RATE: 19 BRPM | TEMPERATURE: 97.4 F | SYSTOLIC BLOOD PRESSURE: 160 MMHG | WEIGHT: 240 LBS | HEIGHT: 63 IN | BODY MASS INDEX: 42.52 KG/M2 | HEART RATE: 74 BPM | DIASTOLIC BLOOD PRESSURE: 70 MMHG

## 2023-11-27 DIAGNOSIS — R21 RASH AND OTHER NONSPECIFIC SKIN ERUPTION: Primary | ICD-10-CM

## 2023-11-27 DIAGNOSIS — R07.9 CHEST PAIN, UNSPECIFIED TYPE: ICD-10-CM

## 2023-11-27 DIAGNOSIS — G62.9 NEUROPATHY: ICD-10-CM

## 2023-11-27 DIAGNOSIS — L03.90 CELLULITIS, UNSPECIFIED CELLULITIS SITE: ICD-10-CM

## 2023-11-27 DIAGNOSIS — R73.9 HYPERGLYCEMIA: ICD-10-CM

## 2023-11-27 DIAGNOSIS — R03.0 ELEVATED BLOOD PRESSURE READING: ICD-10-CM

## 2023-11-27 LAB
ALBUMIN SERPL-MCNC: 3.4 G/DL (ref 3.5–5)
ALBUMIN/GLOB SERPL: 0.8 (ref 1.1–2.2)
ALP SERPL-CCNC: 184 U/L (ref 45–117)
ALT SERPL-CCNC: 27 U/L (ref 12–78)
ANION GAP SERPL CALC-SCNC: 10 MMOL/L (ref 5–15)
APPEARANCE UR: CLEAR
AST SERPL-CCNC: 9 U/L (ref 15–37)
BACTERIA URNS QL MICRO: NEGATIVE /HPF
BASOPHILS # BLD: 0 K/UL (ref 0–0.1)
BASOPHILS NFR BLD: 0 % (ref 0–1)
BILIRUB SERPL-MCNC: 0.3 MG/DL (ref 0.2–1)
BILIRUB UR QL: NEGATIVE
BUN SERPL-MCNC: 13 MG/DL (ref 6–20)
BUN/CREAT SERPL: 13 (ref 12–20)
CALCIUM SERPL-MCNC: 9.5 MG/DL (ref 8.5–10.1)
CHLORIDE SERPL-SCNC: 98 MMOL/L (ref 97–108)
CO2 SERPL-SCNC: 26 MMOL/L (ref 21–32)
COLOR UR: ABNORMAL
CREAT SERPL-MCNC: 1.03 MG/DL (ref 0.55–1.02)
DIFFERENTIAL METHOD BLD: ABNORMAL
EOSINOPHIL # BLD: 0.1 K/UL (ref 0–0.4)
EOSINOPHIL NFR BLD: 1 % (ref 0–7)
EPITH CASTS URNS QL MICRO: ABNORMAL /LPF
ERYTHROCYTE [DISTWIDTH] IN BLOOD BY AUTOMATED COUNT: 12.1 % (ref 11.5–14.5)
GLOBULIN SER CALC-MCNC: 4.2 G/DL (ref 2–4)
GLUCOSE SERPL-MCNC: 427 MG/DL (ref 65–100)
GLUCOSE UR STRIP.AUTO-MCNC: >1000 MG/DL
HCT VFR BLD AUTO: 42.6 % (ref 35–47)
HGB BLD-MCNC: 13.7 G/DL (ref 11.5–16)
HGB UR QL STRIP: NEGATIVE
IMM GRANULOCYTES # BLD AUTO: 0 K/UL (ref 0–0.04)
IMM GRANULOCYTES NFR BLD AUTO: 0 % (ref 0–0.5)
KETONES UR QL STRIP.AUTO: NEGATIVE MG/DL
LEUKOCYTE ESTERASE UR QL STRIP.AUTO: NEGATIVE
LIPASE SERPL-CCNC: 43 U/L (ref 13–75)
LYMPHOCYTES # BLD: 3.7 K/UL (ref 0.8–3.5)
LYMPHOCYTES NFR BLD: 40 % (ref 12–49)
MCH RBC QN AUTO: 27.4 PG (ref 26–34)
MCHC RBC AUTO-ENTMCNC: 32.2 G/DL (ref 30–36.5)
MCV RBC AUTO: 85.2 FL (ref 80–99)
MONOCYTES # BLD: 0.6 K/UL (ref 0–1)
MONOCYTES NFR BLD: 6 % (ref 5–13)
NEUTS SEG # BLD: 5.1 K/UL (ref 1.8–8)
NEUTS SEG NFR BLD: 53 % (ref 32–75)
NITRITE UR QL STRIP.AUTO: NEGATIVE
NRBC # BLD: 0 K/UL (ref 0–0.01)
NRBC BLD-RTO: 0 PER 100 WBC
NT PRO BNP: 90 PG/ML (ref 0–125)
PH UR STRIP: 6.5 (ref 5–8)
PLATELET # BLD AUTO: 201 K/UL (ref 150–400)
PMV BLD AUTO: 11 FL (ref 8.9–12.9)
POTASSIUM SERPL-SCNC: 3.7 MMOL/L (ref 3.5–5.1)
PROT SERPL-MCNC: 7.6 G/DL (ref 6.4–8.2)
PROT UR STRIP-MCNC: NEGATIVE MG/DL
RBC # BLD AUTO: 5 M/UL (ref 3.8–5.2)
RBC #/AREA URNS HPF: ABNORMAL /HPF (ref 0–5)
SODIUM SERPL-SCNC: 134 MMOL/L (ref 136–145)
SP GR UR REFRACTOMETRY: 1.03 (ref 1–1.03)
TROPONIN I SERPL HS-MCNC: 22 NG/L (ref 0–51)
TROPONIN I SERPL HS-MCNC: 22 NG/L (ref 0–51)
URINE CULTURE IF INDICATED: ABNORMAL
UROBILINOGEN UR QL STRIP.AUTO: 0.2 EU/DL (ref 0.2–1)
WBC # BLD AUTO: 9.5 K/UL (ref 3.6–11)
WBC URNS QL MICRO: ABNORMAL /HPF (ref 0–4)

## 2023-11-27 PROCEDURE — 83690 ASSAY OF LIPASE: CPT

## 2023-11-27 PROCEDURE — 81001 URINALYSIS AUTO W/SCOPE: CPT

## 2023-11-27 PROCEDURE — 80053 COMPREHEN METABOLIC PANEL: CPT

## 2023-11-27 PROCEDURE — 84484 ASSAY OF TROPONIN QUANT: CPT

## 2023-11-27 PROCEDURE — 6370000000 HC RX 637 (ALT 250 FOR IP): Performed by: PHYSICIAN ASSISTANT

## 2023-11-27 PROCEDURE — 85025 COMPLETE CBC W/AUTO DIFF WBC: CPT

## 2023-11-27 PROCEDURE — 36415 COLL VENOUS BLD VENIPUNCTURE: CPT

## 2023-11-27 PROCEDURE — 71045 X-RAY EXAM CHEST 1 VIEW: CPT

## 2023-11-27 PROCEDURE — 93005 ELECTROCARDIOGRAM TRACING: CPT | Performed by: EMERGENCY MEDICINE

## 2023-11-27 PROCEDURE — 99285 EMERGENCY DEPT VISIT HI MDM: CPT

## 2023-11-27 PROCEDURE — 83880 ASSAY OF NATRIURETIC PEPTIDE: CPT

## 2023-11-27 RX ORDER — GABAPENTIN 100 MG/1
100 CAPSULE ORAL
Status: COMPLETED | OUTPATIENT
Start: 2023-11-27 | End: 2023-11-27

## 2023-11-27 RX ORDER — 0.9 % SODIUM CHLORIDE 0.9 %
1000 INTRAVENOUS SOLUTION INTRAVENOUS ONCE
Status: DISCONTINUED | OUTPATIENT
Start: 2023-11-27 | End: 2023-11-27

## 2023-11-27 RX ORDER — HYDROCODONE BITARTRATE AND ACETAMINOPHEN 5; 325 MG/1; MG/1
1 TABLET ORAL
Status: COMPLETED | OUTPATIENT
Start: 2023-11-27 | End: 2023-11-27

## 2023-11-27 RX ORDER — GABAPENTIN 100 MG/1
100 CAPSULE ORAL NIGHTLY
Qty: 14 CAPSULE | Refills: 0 | Status: SHIPPED | OUTPATIENT
Start: 2023-11-27 | End: 2023-12-11

## 2023-11-27 RX ORDER — CEPHALEXIN 500 MG/1
500 CAPSULE ORAL 4 TIMES DAILY
Qty: 28 CAPSULE | Refills: 0 | Status: SHIPPED | OUTPATIENT
Start: 2023-11-27 | End: 2023-12-04

## 2023-11-27 RX ORDER — CEPHALEXIN 500 MG/1
500 CAPSULE ORAL
Status: COMPLETED | OUTPATIENT
Start: 2023-11-27 | End: 2023-11-27

## 2023-11-27 RX ADMIN — CEPHALEXIN 500 MG: 500 CAPSULE ORAL at 18:17

## 2023-11-27 RX ADMIN — HYDROCODONE BITARTRATE AND ACETAMINOPHEN 1 TABLET: 5; 325 TABLET ORAL at 19:25

## 2023-11-27 RX ADMIN — GABAPENTIN 100 MG: 100 CAPSULE ORAL at 18:17

## 2023-11-27 ASSESSMENT — PAIN - FUNCTIONAL ASSESSMENT
PAIN_FUNCTIONAL_ASSESSMENT: NONE - DENIES PAIN
PAIN_FUNCTIONAL_ASSESSMENT: 0-10

## 2023-11-27 ASSESSMENT — PAIN SCALES - GENERAL
PAINLEVEL_OUTOF10: 0
PAINLEVEL_OUTOF10: 8
PAINLEVEL_OUTOF10: 10

## 2023-11-27 ASSESSMENT — HEART SCORE: ECG: 0

## 2023-11-27 ASSESSMENT — PAIN DESCRIPTION - DESCRIPTORS
DESCRIPTORS: BURNING
DESCRIPTORS: SORE;BURNING

## 2023-11-27 ASSESSMENT — PAIN DESCRIPTION - ORIENTATION
ORIENTATION: LEFT;RIGHT
ORIENTATION: RIGHT

## 2023-11-27 ASSESSMENT — PAIN DESCRIPTION - LOCATION
LOCATION: LEG
LOCATION: LEG

## 2023-11-27 NOTE — ED PROVIDER NOTES
disregards these errors.  Please excuse any errors that have escaped final proofreading.)         Jayshree Butler, [address] [place] [address] Road  11/28/23 0519

## 2023-11-27 NOTE — ED TRIAGE NOTES
Triage Note: Patient arrives to ER complaining of insect bite. Patient states she thinks she was bitten by a bug on Tuesday evening while standing on the side of the road after her car broke down. Patient states the area is red and swollen with 5 bumps with purulent drainage, unable to visualize wound due to restrictive clothing. States the are is very painful. Applied neosporin with no relief. Also complains of chest pain and shortness of breath, unsure is this is related to the insect bite or her enlarged heart.

## 2023-11-28 LAB
EKG ATRIAL RATE: 91 BPM
EKG DIAGNOSIS: NORMAL
EKG P AXIS: 51 DEGREES
EKG P-R INTERVAL: 168 MS
EKG Q-T INTERVAL: 406 MS
EKG QRS DURATION: 142 MS
EKG QTC CALCULATION (BAZETT): 499 MS
EKG R AXIS: 85 DEGREES
EKG T AXIS: -79 DEGREES
EKG VENTRICULAR RATE: 91 BPM

## 2023-11-28 PROCEDURE — 93010 ELECTROCARDIOGRAM REPORT: CPT | Performed by: SPECIALIST

## 2023-11-28 NOTE — ED NOTES
Straight stick to RAC. Blood collected & walked to lab. PT tolerated well. snacks provided.       Lizzy Ga RN  11/27/23 193

## 2023-11-28 NOTE — DISCHARGE INSTRUCTIONS
201 150 - 400 K/uL    MPV 11.0 8.9 - 12.9 FL    Nucleated RBCs 0.0 0  WBC    nRBC 0.00 0.00 - 0.01 K/uL    Neutrophils % 53 32 - 75 %    Lymphocytes % 40 12 - 49 %    Monocytes % 6 5 - 13 %    Eosinophils % 1 0 - 7 %    Basophils % 0 0 - 1 %    Immature Granulocytes 0 0.0 - 0.5 %    Neutrophils Absolute 5.1 1.8 - 8.0 K/UL    Lymphocytes Absolute 3.7 (H) 0.8 - 3.5 K/UL    Monocytes Absolute 0.6 0.0 - 1.0 K/UL    Eosinophils Absolute 0.1 0.0 - 0.4 K/UL    Basophils Absolute 0.0 0.0 - 0.1 K/UL    Absolute Immature Granulocyte 0.0 0.00 - 0.04 K/UL    Differential Type AUTOMATED     CMP    Collection Time: 11/27/23  5:37 PM   Result Value Ref Range    Sodium 134 (L) 136 - 145 mmol/L    Potassium 3.7 3.5 - 5.1 mmol/L    Chloride 98 97 - 108 mmol/L    CO2 26 21 - 32 mmol/L    Anion Gap 10 5 - 15 mmol/L    Glucose 427 (H) 65 - 100 mg/dL    BUN 13 6 - 20 MG/DL    Creatinine 1.03 (H) 0.55 - 1.02 MG/DL    Bun/Cre Ratio 13 12 - 20      Est, Glom Filt Rate >60 >60 ml/min/1.73m2    Calcium 9.5 8.5 - 10.1 MG/DL    Total Bilirubin 0.3 0.2 - 1.0 MG/DL    ALT 27 12 - 78 U/L    AST 9 (L) 15 - 37 U/L    Alk Phosphatase 184 (H) 45 - 117 U/L    Total Protein 7.6 6.4 - 8.2 g/dL    Albumin 3.4 (L) 3.5 - 5.0 g/dL    Globulin 4.2 (H) 2.0 - 4.0 g/dL    Albumin/Globulin Ratio 0.8 (L) 1.1 - 2.2     Lipase    Collection Time: 11/27/23  5:37 PM   Result Value Ref Range    Lipase 43 13 - 75 U/L   Troponin    Collection Time: 11/27/23  5:37 PM   Result Value Ref Range    Troponin, High Sensitivity 22 0 - 51 ng/L   Brain Natriuretic Peptide    Collection Time: 11/27/23  5:37 PM   Result Value Ref Range    NT Pro-BNP 90 0 - 125 PG/ML   Urinalysis with Reflex to Culture    Collection Time: 11/27/23  5:37 PM    Specimen: Urine   Result Value Ref Range    Color, UA YELLOW/STRAW      Appearance CLEAR CLEAR      Specific Gravity, UA 1.030 1.003 - 1.030      pH, Urine 6.5 5.0 - 8.0      Protein, UA Negative NEG mg/dL    Glucose, UA >1000 (A) NEG

## 2023-11-28 NOTE — ED NOTES
Patient (s)  given copy of dc instructions and 2 script(s). Patient (s)  verbalized understanding of instructions and script (s). Patient given a current medication reconciliation form and verbalized understanding of their medications. Patient (s) verbalized understanding of the importance of discussing medications with his or her physician or clinic they will be following up with. Patient alert and oriented and in no acute distress. Patient discharged home ambulatory with a steady gait unassisted.       Huber Cortes RN  11/27/23 9043

## 2023-12-15 ENCOUNTER — OFFICE VISIT (OUTPATIENT)
Age: 58
End: 2023-12-15
Payer: MEDICAID

## 2023-12-15 VITALS
BODY MASS INDEX: 42.88 KG/M2 | OXYGEN SATURATION: 97 % | WEIGHT: 242 LBS | SYSTOLIC BLOOD PRESSURE: 140 MMHG | HEART RATE: 81 BPM | DIASTOLIC BLOOD PRESSURE: 92 MMHG | HEIGHT: 63 IN

## 2023-12-15 DIAGNOSIS — G47.33 OBSTRUCTIVE SLEEP APNEA (ADULT) (PEDIATRIC): ICD-10-CM

## 2023-12-15 DIAGNOSIS — G47.33 OSA (OBSTRUCTIVE SLEEP APNEA): ICD-10-CM

## 2023-12-15 DIAGNOSIS — I10 ESSENTIAL (PRIMARY) HYPERTENSION: ICD-10-CM

## 2023-12-15 DIAGNOSIS — I42.2 OTHER HYPERTROPHIC CARDIOMYOPATHY (HCC): ICD-10-CM

## 2023-12-15 DIAGNOSIS — I10 PRIMARY HYPERTENSION: Primary | ICD-10-CM

## 2023-12-15 PROCEDURE — 99214 OFFICE O/P EST MOD 30 MIN: CPT | Performed by: INTERNAL MEDICINE

## 2023-12-15 PROCEDURE — 3077F SYST BP >= 140 MM HG: CPT | Performed by: INTERNAL MEDICINE

## 2023-12-15 PROCEDURE — 93005 ELECTROCARDIOGRAM TRACING: CPT | Performed by: INTERNAL MEDICINE

## 2023-12-15 PROCEDURE — 3080F DIAST BP >= 90 MM HG: CPT | Performed by: INTERNAL MEDICINE

## 2023-12-15 PROCEDURE — 93010 ELECTROCARDIOGRAM REPORT: CPT | Performed by: INTERNAL MEDICINE

## 2023-12-15 RX ORDER — ATENOLOL 25 MG/1
TABLET ORAL
Qty: 2 TABLET | Refills: 0 | Status: SHIPPED | OUTPATIENT
Start: 2023-12-15

## 2023-12-15 RX ORDER — ERGOCALCIFEROL 1.25 MG/1
50000 CAPSULE ORAL WEEKLY
COMMUNITY
Start: 2023-09-14

## 2023-12-15 RX ORDER — AMLODIPINE AND BENAZEPRIL HYDROCHLORIDE 10; 40 MG/1; MG/1
1 CAPSULE ORAL DAILY
Qty: 90 CAPSULE | Refills: 3 | Status: SHIPPED | OUTPATIENT
Start: 2023-12-15

## 2023-12-15 RX ORDER — SITAGLIPTIN 100 MG/1
50 TABLET, FILM COATED ORAL DAILY
COMMUNITY
Start: 2023-09-14

## 2023-12-15 RX ORDER — METOPROLOL SUCCINATE 25 MG/1
25 TABLET, EXTENDED RELEASE ORAL DAILY
Qty: 90 TABLET | Refills: 3 | Status: SHIPPED | OUTPATIENT
Start: 2023-12-15

## 2023-12-15 RX ORDER — CEPHALEXIN 500 MG/1
500 CAPSULE ORAL 2 TIMES DAILY
COMMUNITY
Start: 2023-12-11

## 2023-12-15 NOTE — PROGRESS NOTES
Received VO from Dr. Gabino Ca:  Start Toprol XL 25mg po daily. Cardiac MRI for HCM. Refill Lotrel. See Shana Nolasco NP in 3 months. Refill per VO of Dr. Nori Alves:    3/14/2023    Future Appointments   Date Time Provider [address]   3/26/2024  3:20 PM KEYANA Pineda - NP [facility]       Requested Prescriptions     Signed Prescriptions Disp Refills    metoprolol succinate (TOPROL XL) 25 MG extended release tablet 90 tablet 3     Sig: Take 1 tablet by mouth daily    atenolol (TENORMIN) 25 MG tablet 2 tablet 0     Sig: Take one tablets the night before procedure; then one tablet 1 hour before procedure.  For CT, one time order    amLODIPine-benazepril (LOTREL) 10-40 MG per capsule 90 capsule 3     Sig: Take 1 capsule by mouth daily

## 2023-12-15 NOTE — PROGRESS NOTES
Chief Complaint   Patient presents with    Hypertension    Other     LBBB     Vitals:    12/15/23 1551   BP: (!) 140/92   Site: Left Upper Arm   Position: Sitting   Pulse: 81   SpO2: 97%   Weight: 109.8 kg (242 lb)   Height: 1.6 m (5' 3\")     Chest pain: DENIED     Recent hospital stays: DENIED     Refills: DENIED

## 2024-04-11 ENCOUNTER — OFFICE VISIT (OUTPATIENT)
Age: 59
End: 2024-04-11
Payer: MEDICAID

## 2024-04-11 VITALS
DIASTOLIC BLOOD PRESSURE: 80 MMHG | RESPIRATION RATE: 18 BRPM | SYSTOLIC BLOOD PRESSURE: 140 MMHG | HEART RATE: 78 BPM | HEIGHT: 63 IN | BODY MASS INDEX: 40.57 KG/M2 | WEIGHT: 229 LBS | OXYGEN SATURATION: 98 %

## 2024-04-11 DIAGNOSIS — I42.2 OTHER HYPERTROPHIC CARDIOMYOPATHY (HCC): ICD-10-CM

## 2024-04-11 DIAGNOSIS — E11.49 DM (DIABETES MELLITUS), TYPE 2 WITH NEUROLOGICAL COMPLICATIONS (HCC): ICD-10-CM

## 2024-04-11 DIAGNOSIS — I10 PRIMARY HYPERTENSION: Primary | ICD-10-CM

## 2024-04-11 DIAGNOSIS — G47.33 OBSTRUCTIVE SLEEP APNEA (ADULT) (PEDIATRIC): ICD-10-CM

## 2024-04-11 DIAGNOSIS — I44.7 LBBB (LEFT BUNDLE BRANCH BLOCK): ICD-10-CM

## 2024-04-11 DIAGNOSIS — E78.2 MIXED HYPERLIPIDEMIA: ICD-10-CM

## 2024-04-11 PROCEDURE — 99214 OFFICE O/P EST MOD 30 MIN: CPT

## 2024-04-11 PROCEDURE — 3079F DIAST BP 80-89 MM HG: CPT

## 2024-04-11 PROCEDURE — 3077F SYST BP >= 140 MM HG: CPT

## 2024-04-11 RX ORDER — METOPROLOL SUCCINATE 25 MG/1
25 TABLET, EXTENDED RELEASE ORAL DAILY
Qty: 90 TABLET | Refills: 3 | Status: SHIPPED | OUTPATIENT
Start: 2024-04-11

## 2024-04-11 RX ORDER — AMLODIPINE AND BENAZEPRIL HYDROCHLORIDE 10; 40 MG/1; MG/1
1 CAPSULE ORAL DAILY
Qty: 90 CAPSULE | Refills: 3 | Status: SHIPPED | OUTPATIENT
Start: 2024-04-11

## 2024-04-11 RX ORDER — PREGABALIN 75 MG/1
75 CAPSULE ORAL DAILY
COMMUNITY
Start: 2024-03-04

## 2024-04-11 RX ORDER — VALSARTAN AND HYDROCHLOROTHIAZIDE 160; 12.5 MG/1; MG/1
1 TABLET, FILM COATED ORAL DAILY
COMMUNITY
Start: 2024-04-03 | End: 2024-04-11

## 2024-04-11 RX ORDER — GLIPIZIDE 2.5 MG/1
2.5 TABLET, EXTENDED RELEASE ORAL DAILY
COMMUNITY
Start: 2024-04-03

## 2024-04-11 RX ORDER — ATENOLOL 25 MG/1
TABLET ORAL
Qty: 2 TABLET | Refills: 0 | Status: SHIPPED | OUTPATIENT
Start: 2024-04-11

## 2024-04-11 RX ORDER — AMLODIPINE BESYLATE 10 MG/1
5 TABLET ORAL DAILY
COMMUNITY
Start: 2023-09-14 | End: 2024-04-11

## 2024-04-11 NOTE — PROGRESS NOTES
had concerns including Hypertension.    Vitals:    04/11/24 1315   BP: (!) 140/80   Site: Left Upper Arm   Position: Sitting   Pulse: 78   Resp: 18   SpO2: 98%   Weight: 103.9 kg (229 lb)   Height: 1.6 m (5' 3\")        Chest pain No    Refills No    Spot on right leg     1. Have you been to the ER, urgent care clinic since your last visit? No       Hospitalized since your last visit? No       Where?        Date?  
appropriate to mood  Eyes - sclera anicteric, moist mucous membranes  Neck - supple, no significant adenopathy  Chest - clear to auscultation, no wheezes, rales or rhonchi  Heart - normal rate, regular rhythm, normal S1, S2, Late peaking systolic murmur. , no rubs, clicks or gallops  Abdomen - soft, nontender, nondistended, no masses or organomegaly  Extremities - peripheral pulses normal, no pedal edema  Skin - normal coloration  no rashes    Medications:     Current Outpatient Medications   Medication Sig    glipiZIDE (GLUCOTROL XL) 2.5 MG extended release tablet Take 1 tablet by mouth daily    pregabalin (LYRICA) 75 MG capsule Take 1 capsule by mouth daily. Max Daily Amount: 75 mg    amLODIPine-benazepril (LOTREL) 10-40 MG per capsule Take 1 capsule by mouth daily    atenolol (TENORMIN) 25 MG tablet Take one tablets the night before procedure; then one tablet 1 hour before procedure. For MRI one time order    metoprolol succinate (TOPROL XL) 25 MG extended release tablet Take 1 tablet by mouth daily    SITagliptin (JANUVIA) 50 MG tablet Take 1 tablet by mouth daily    vitamin D (ERGOCALCIFEROL) 1.25 MG (10560 UT) CAPS capsule Take 1 capsule by mouth once a week    albuterol sulfate HFA (PROVENTIL;VENTOLIN;PROAIR) 108 (90 Base) MCG/ACT inhaler Inhale 2 puffs into the lungs every 6 hours as needed    gabapentin (NEURONTIN) 100 MG capsule Take 1 capsule by mouth at bedtime for 14 days. Intended supply: 90 days Max Daily Amount: 100 mg    insulin glargine (LANTUS SOLOSTAR) 100 UNIT/ML injection pen Inject 35 Units into the skin    metFORMIN (GLUCOPHAGE-XR) 500 MG extended release tablet Take 1 tablet by mouth Daily with supper     No current facility-administered medications for this visit.      Diagnostic Data Review:     2/28/23 Echo      Left Ventricle: Normal left ventricular systolic function with a visually estimated EF of 65 - 70%. Left ventricle is smaller than normal. Severely increased wall thickness.

## 2024-04-11 NOTE — PATIENT INSTRUCTIONS
Please call central scheduling [phone] to schedule your Cardiac MRI   Please Start Toprol 25mg nightly  Restart amlodipine-benazepril (LOTREL) 10-40 MG     Do not take valsartan-hydroCHLOROthiazide (DIOVAN-HCT) 160-12.5 MG per tablet or plain amlodipine    one tablet atenolol the night before MRI; then one tablet 1 hour before MRI

## 2024-04-18 ENCOUNTER — TELEPHONE (OUTPATIENT)
Age: 59
End: 2024-04-18

## 2024-04-18 NOTE — TELEPHONE ENCOUNTER
Spoke with  Cam, PA from [facility] , identified the pt with name and . Pt currently at the ER, he is asking for Echo, stress results, if there was any LHC done.  I read him the results he was looking for, informed him she has not been cath'd nor plan for one. At [facility]  on  with Betzy the only test ordered was a CMRI, which has not been scheduled yet.  Cam expressed understanding and agreement. Pt has no further questions or concerns at this time.

## 2024-05-09 RX ORDER — ATENOLOL 25 MG/1
TABLET ORAL
Qty: 2 TABLET | Refills: 0 | Status: CANCELLED | OUTPATIENT
Start: 2024-05-09

## 2025-08-17 ENCOUNTER — OFFICE VISIT (OUTPATIENT)
Age: 60
End: 2025-08-17

## 2025-08-17 VITALS
SYSTOLIC BLOOD PRESSURE: 122 MMHG | HEART RATE: 83 BPM | WEIGHT: 246 LBS | DIASTOLIC BLOOD PRESSURE: 69 MMHG | TEMPERATURE: 98.6 F | OXYGEN SATURATION: 96 % | BODY MASS INDEX: 43.58 KG/M2

## 2025-08-17 DIAGNOSIS — H00.011 HORDEOLUM EXTERNUM OF RIGHT UPPER EYELID: Primary | ICD-10-CM

## 2025-08-17 RX ORDER — PRASUGREL 10 MG/1
10 TABLET, FILM COATED ORAL DAILY
COMMUNITY

## 2025-08-17 RX ORDER — FLUTICASONE PROPIONATE 50 MCG
1 SPRAY, SUSPENSION (ML) NASAL DAILY PRN
COMMUNITY

## 2025-08-17 RX ORDER — ERYTHROMYCIN 5 MG/G
OINTMENT OPHTHALMIC
Qty: 3.5 G | Refills: 0 | Status: SHIPPED | OUTPATIENT
Start: 2025-08-17 | End: 2025-08-17

## 2025-08-17 RX ORDER — DAPAGLIFLOZIN 10 MG/1
10 TABLET, FILM COATED ORAL EVERY MORNING
COMMUNITY

## 2025-08-17 RX ORDER — FUROSEMIDE 20 MG/1
20 TABLET ORAL 2 TIMES DAILY
COMMUNITY

## 2025-08-17 RX ORDER — ERYTHROMYCIN 5 MG/G
OINTMENT OPHTHALMIC
Qty: 3.5 G | Refills: 0 | Status: SHIPPED | OUTPATIENT
Start: 2025-08-17 | End: 2025-08-27

## 2025-08-17 RX ORDER — ISOSORBIDE MONONITRATE 60 MG/1
60 TABLET, EXTENDED RELEASE ORAL DAILY
COMMUNITY

## 2025-08-17 RX ORDER — CETIRIZINE HYDROCHLORIDE 10 MG/1
10 TABLET ORAL DAILY
COMMUNITY